# Patient Record
Sex: FEMALE | Race: WHITE | Employment: FULL TIME | URBAN - METROPOLITAN AREA
[De-identification: names, ages, dates, MRNs, and addresses within clinical notes are randomized per-mention and may not be internally consistent; named-entity substitution may affect disease eponyms.]

---

## 2024-02-28 ENCOUNTER — OFFICE VISIT (OUTPATIENT)
Dept: PRIMARY CARE CLINIC | Age: 29
End: 2024-02-28
Payer: COMMERCIAL

## 2024-02-28 VITALS
HEART RATE: 119 BPM | TEMPERATURE: 97.8 F | OXYGEN SATURATION: 96 % | DIASTOLIC BLOOD PRESSURE: 80 MMHG | SYSTOLIC BLOOD PRESSURE: 118 MMHG

## 2024-02-28 DIAGNOSIS — B96.89 ACUTE BACTERIAL SINUSITIS: Primary | ICD-10-CM

## 2024-02-28 DIAGNOSIS — J01.90 ACUTE BACTERIAL SINUSITIS: Primary | ICD-10-CM

## 2024-02-28 DIAGNOSIS — J02.9 SORE THROAT: ICD-10-CM

## 2024-02-28 DIAGNOSIS — Z76.89 ESTABLISHING CARE WITH NEW DOCTOR, ENCOUNTER FOR: ICD-10-CM

## 2024-02-28 LAB — S PYO AG THROAT QL: NORMAL

## 2024-02-28 PROCEDURE — 99203 OFFICE O/P NEW LOW 30 MIN: CPT | Performed by: FAMILY MEDICINE

## 2024-02-28 PROCEDURE — 87880 STREP A ASSAY W/OPTIC: CPT | Performed by: FAMILY MEDICINE

## 2024-02-28 RX ORDER — IPRATROPIUM BROMIDE 42 UG/1
2 SPRAY, METERED NASAL 4 TIMES DAILY
Qty: 15 ML | Refills: 1 | Status: SHIPPED | OUTPATIENT
Start: 2024-02-28

## 2024-02-28 RX ORDER — AMOXICILLIN AND CLAVULANATE POTASSIUM 875; 125 MG/1; MG/1
1 TABLET, FILM COATED ORAL 2 TIMES DAILY
Qty: 14 TABLET | Refills: 0 | Status: SHIPPED | OUTPATIENT
Start: 2024-02-28 | End: 2024-03-06

## 2024-02-28 NOTE — PROGRESS NOTES
months  - amoxicillin-clavulanate (AUGMENTIN) 875-125 MG per tablet; Take 1 tablet by mouth 2 times daily for 7 days  Dispense: 14 tablet; Refill: 0    2. Sore throat  As above  - POCT rapid strep A  - amoxicillin-clavulanate (AUGMENTIN) 875-125 MG per tablet; Take 1 tablet by mouth 2 times daily for 7 days  Dispense: 14 tablet; Refill: 0  - ipratropium (ATROVENT) 0.06 % nasal spray; 2 sprays by Each Nostril route 4 times daily  Dispense: 15 mL; Refill: 1    3. Establishing care with new doctor, encounter for  Updated medical, surgical, family, and social histories and medication list as appropriate.         Counseled regarding above diagnosis, including possible risks and complications, especially if left uncontrolled. Counseled regarding the possible side effects, risks, benefits and alternatives to treatment; patient and/or guardian verbalizes understanding, agrees, feels comfortable with, and wishes to proceed with above treatment plan.    Call or go to ED immediately if symptoms worsen or persist. Advised patient to call with any new medication issues and, as applicable, read all Rx info from pharmacy to assure aware of all possible risks and side effects of medication before taking.    Patient and/or guardian given opportunity to ask questions/raise concerns. The patient verbalized comfort and understanding of instructions.    I encourage further reading and education about your health conditions.  Information on many health conditions is provided by the American Academy [employer]icians: https://familydoctor.org/  Please bring any questions to me at your next visit.    Return to Office: Return in about 3 months (around 5/28/2024) for well visit.    Zara Melgoza DO

## 2024-03-25 PROBLEM — E66.3 OVERWEIGHT: Status: RESOLVED | Noted: 2019-02-13 | Resolved: 2024-03-25

## 2024-03-26 ENCOUNTER — TELEPHONE (OUTPATIENT)
Dept: INTERNAL MEDICINE | Age: 29
End: 2024-03-26

## 2024-03-26 NOTE — TELEPHONE ENCOUNTER
Specialty Medication Service    Date: 3/26/2024  Patient's Name: Xochitl Metcalf YOB: 1995            _____________________________________________________________________________________________    Patient is enrolled in the [facility] pharmacy benefits. A prescription for Qulipta was sent to [facility] pharmacy, however per patient insurance it will need to go to [facility]. Please review, sign and fax to pharmacy if order is still appropriate.     Renny Purvis, PharmD [facility]  Ambulatory Clinical Pharmacist  Specialty Medication Services  Phone: [phone]  Fax: [phone]

## 2024-04-02 NOTE — TELEPHONE ENCOUNTER
Specialty Medication Service    Date: 4/2/2024  Patient's Name: Xochitl Metcalf YOB: 1995            _____________________________________________________________________________________________    Patient no longer has [employer] benefits (termed 3/31/2024). Patient is no longer eligible in [employer] program. No further outreach planned.     Renny Purvis, PharmD [facility]  Ambulatory Clinical Pharmacist  Specialty Medication Services  Phone: [phone]  Fax: [phone]    For Pharmacy Admin Tracking Only    Program: Downstream  CPA in place:  No  Time Spent (min): 15

## 2024-08-29 PROCEDURE — RXMED WILLOW AMBULATORY MEDICATION CHARGE

## 2024-08-30 ENCOUNTER — PHARMACY VISIT (OUTPATIENT)
Dept: PHARMACY | Facility: CLINIC | Age: 29
End: 2024-08-30
Payer: MEDICARE

## 2024-09-19 PROBLEM — Z01.419 WELL WOMAN EXAM WITH ROUTINE GYNECOLOGICAL EXAM: Status: ACTIVE | Noted: 2024-09-19

## 2024-09-19 NOTE — PROGRESS NOTES
Subjective   Patient ID: Ina Rahman is a 29 y.o. female who presents for New Patient Visit (Desires pregnancy).  She presents as a new patient for gyn visit. She desires annual exam in addition to evaluation of fertility. No prior records are available for review. She does not remember when her last pap was exactly but believes it was in 2021. She has a history of abnormal pap and did have colposcopy performed.     She states she has a history of PCOS and irregular menses. Ultrasound was performed in around 2022 and she believes this was without abnormality. She stopped OCP in 2019 and has had no menses since 2022. This last one lasted one month with heavy flow and she has had no bleeding since. She was on metformin in the distant past but was intolerant of it with GI upset. She has not had ultrasound or lab testing recently.     Her partner has had a child and there is no suspicion for abnormality of semen.   She denies any past pelvic infection to suggest tubal occulusion.          Review of Systems   Constitutional:  Negative for activity change.   HENT:  Negative for congestion.    Respiratory:  Negative for apnea and cough.    Cardiovascular:  Negative for chest pain.   Gastrointestinal:  Negative for constipation and diarrhea.   Genitourinary:  Negative for hematuria and vaginal pain.   Musculoskeletal:  Negative for joint swelling.   Neurological:  Negative for dizziness.   Psychiatric/Behavioral:  Negative for agitation.        History reviewed. No pertinent past medical history.   History reviewed. No pertinent surgical history.   No Known Allergies   Current Outpatient Medications on File Prior to Visit   Medication Sig Dispense Refill    rimegepant (NURTEC) 75 mg tablet,disintegrating Take 1 tablet (75 mg) by mouth once daily as needed for migraines. 16 tablet 11     No current facility-administered medications on file prior to visit.        Objective   Physical Exam  Constitutional:       Appearance:  Normal appearance.   Neck:      Thyroid: No thyromegaly.   Cardiovascular:      Rate and Rhythm: Normal rate and regular rhythm.      Heart sounds: Normal heart sounds.   Pulmonary:      Effort: Pulmonary effort is normal.      Breath sounds: Normal breath sounds.   Chest:      Chest wall: No mass.   Breasts:     Right: Normal. No inverted nipple, mass, nipple discharge or skin change.      Left: Normal. No inverted nipple, mass, nipple discharge or skin change.   Abdominal:      General: There is no distension.      Palpations: Abdomen is soft. There is no mass.      Tenderness: There is no abdominal tenderness.   Genitourinary:     General: Normal vulva.      Exam position: Lithotomy position.      Labia:         Right: No rash.         Left: No rash.       Urethra: No urethral lesion.      Vagina: Normal. No lesions.      Cervix: No friability or lesion.      Uterus: Normal. Not enlarged and not tender.       Adnexa: Right adnexa normal and left adnexa normal.        Right: No mass or tenderness.          Left: No mass or tenderness.     Musculoskeletal:         General: No deformity.      Cervical back: Neck supple.   Lymphadenopathy:      Cervical: No cervical adenopathy.   Skin:     General: Skin is warm and dry.      Findings: No rash.   Neurological:      General: No focal deficit present.      Mental Status: She is alert.   Psychiatric:         Mood and Affect: Mood normal.         Behavior: Behavior is cooperative.         Thought Content: Thought content normal.           Problem List Items Addressed This Visit       Well woman exam with routine gynecological exam - Primary    Overview     No prior gyn records or test results are available for review.          Current Assessment & Plan     Pap is sent with HPV and STD screen.  Regular exercise and attaining/maintaining a healthy weight is encouraged.   Adequate calcium intake with diet or supplements is encouraged.    We will notify of any abnormal  results.          Amenorrhea, secondary    Overview     No menses since 2022. Past diagnosis of PCOS.         Current Assessment & Plan     Potential causes for fertility issues were reviewed. These include male factors such as abnormalities in sperm production or quality. Female factors include medical and hormonal issues which may influence ovulation. Potential for scar formation and tubal blockage were also discussed. Based on the medical histories of both partners, appropriate testing was ordered. We will call for a review of these tests as indicated. It is unlikely there is male factor or tubal factor infertility.  FSH, LH, fasting insulin, free and total testosterone, TSH, Hgb A1c, prolactin are ordered along with ultrasound.   HCG is negative in the office. Provera is prescribed to initiate menses. We reviewed potential for endometrial hyperplasia or cancer if there is no regular shedding of the endometrium.  Timed intercourse was also reviewed. The recommendation is for regular intercourse every 1-2 days starting by cycle day 11-12 and continuing through cycle day 16. This will help to assure that sperm are present and waiting for the egg at ovulation.   Taking a daily prenatal vitamin is recommended.         Relevant Medications    medroxyPROGESTERone (Provera) 10 mg tablet    Other Relevant Orders    TSH with reflex to Free T4 if abnormal    FSH & LH    Prolactin    Estradiol    Testosterone,Free and Total    Insulin, Fasting    Hemoglobin A1C    US PELVIS TRANSABDOMINAL WITH TRANSVAGINAL

## 2024-09-19 NOTE — ASSESSMENT & PLAN NOTE
Pap is sent with HPV and STD screen.  Regular exercise and attaining/maintaining a healthy weight is encouraged.   Adequate calcium intake with diet or supplements is encouraged.    We will notify of any abnormal results.

## 2024-09-24 PROCEDURE — RXMED WILLOW AMBULATORY MEDICATION CHARGE

## 2024-09-25 ENCOUNTER — PHARMACY VISIT (OUTPATIENT)
Dept: PHARMACY | Facility: CLINIC | Age: 29
End: 2024-09-25
Payer: MEDICARE

## 2024-09-25 ENCOUNTER — APPOINTMENT (OUTPATIENT)
Dept: OBSTETRICS AND GYNECOLOGY | Facility: CLINIC | Age: 29
End: 2024-09-25
Payer: COMMERCIAL

## 2024-09-25 VITALS
DIASTOLIC BLOOD PRESSURE: 80 MMHG | HEIGHT: 67 IN | SYSTOLIC BLOOD PRESSURE: 118 MMHG | BODY MASS INDEX: 33.27 KG/M2 | WEIGHT: 212 LBS

## 2024-09-25 DIAGNOSIS — N91.1 AMENORRHEA, SECONDARY: ICD-10-CM

## 2024-09-25 DIAGNOSIS — Z01.419 WELL WOMAN EXAM WITH ROUTINE GYNECOLOGICAL EXAM: Primary | ICD-10-CM

## 2024-09-25 LAB — PREGNANCY TEST URINE, POC: NEGATIVE

## 2024-09-25 PROCEDURE — RXMED WILLOW AMBULATORY MEDICATION CHARGE

## 2024-09-25 PROCEDURE — 87591 N.GONORRHOEAE DNA AMP PROB: CPT

## 2024-09-25 PROCEDURE — 87624 HPV HI-RISK TYP POOLED RSLT: CPT

## 2024-09-25 PROCEDURE — 87491 CHLMYD TRACH DNA AMP PROBE: CPT

## 2024-09-25 PROCEDURE — 88175 CYTOPATH C/V AUTO FLUID REDO: CPT

## 2024-09-25 RX ORDER — MEDROXYPROGESTERONE ACETATE 10 MG/1
10 TABLET ORAL DAILY
Qty: 30 TABLET | Refills: 3 | Status: SHIPPED | OUTPATIENT
Start: 2024-09-25 | End: 2025-09-25

## 2024-09-25 ASSESSMENT — ENCOUNTER SYMPTOMS
ACTIVITY CHANGE: 0
DIZZINESS: 0
APNEA: 0
HEMATURIA: 0
JOINT SWELLING: 0
DIARRHEA: 0
AGITATION: 0
CONSTIPATION: 0
COUGH: 0

## 2024-09-25 NOTE — ASSESSMENT & PLAN NOTE
>>ASSESSMENT AND PLAN FOR AMENORRHEA, SECONDARY WRITTEN ON 9/25/2024  8:12 AM BY ALTON MANDEL MD    Potential causes for fertility issues were reviewed. These include male factors such as abnormalities in sperm production or quality. Female factors include medical and hormonal issues which may influence ovulation. Potential for scar formation and tubal blockage were also discussed. Based on the medical histories of both partners, appropriate testing was ordered. We will call for a review of these tests as indicated. It is unlikely there is male factor or tubal factor infertility.  FSH, LH, fasting insulin, free and total testosterone, TSH, Hgb A1c, prolactin are ordered along with ultrasound.   HCG is negative in the office. Provera is prescribed to initiate menses. We reviewed potential for endometrial hyperplasia or cancer if there is no regular shedding of the endometrium.  Timed intercourse was also reviewed. The recommendation is for regular intercourse every 1-2 days starting by cycle day 11-12 and continuing through cycle day 16. This will help to assure that sperm are present and waiting for the egg at ovulation.   Taking a daily prenatal vitamin is recommended.

## 2024-09-25 NOTE — ASSESSMENT & PLAN NOTE
Potential causes for fertility issues were reviewed. These include male factors such as abnormalities in sperm production or quality. Female factors include medical and hormonal issues which may influence ovulation. Potential for scar formation and tubal blockage were also discussed. Based on the medical histories of both partners, appropriate testing was ordered. We will call for a review of these tests as indicated. It is unlikely there is male factor or tubal factor infertility.  FSH, LH, fasting insulin, free and total testosterone, TSH, Hgb A1c, prolactin are ordered along with ultrasound.   HCG is negative in the office. Provera is prescribed to initiate menses. We reviewed potential for endometrial hyperplasia or cancer if there is no regular shedding of the endometrium.  Timed intercourse was also reviewed. The recommendation is for regular intercourse every 1-2 days starting by cycle day 11-12 and continuing through cycle day 16. This will help to assure that sperm are present and waiting for the egg at ovulation.   Taking a daily prenatal vitamin is recommended.

## 2024-09-26 ENCOUNTER — APPOINTMENT (OUTPATIENT)
Dept: ORTHOPEDIC SURGERY | Facility: CLINIC | Age: 29
End: 2024-09-26
Payer: COMMERCIAL

## 2024-09-26 ENCOUNTER — HOSPITAL ENCOUNTER (OUTPATIENT)
Dept: RADIOLOGY | Facility: HOSPITAL | Age: 29
Discharge: HOME | End: 2024-09-26
Payer: COMMERCIAL

## 2024-09-26 VITALS — HEIGHT: 67 IN | WEIGHT: 212 LBS | BODY MASS INDEX: 33.27 KG/M2

## 2024-09-26 DIAGNOSIS — M89.8X1 PAIN OF LEFT CLAVICLE: ICD-10-CM

## 2024-09-26 DIAGNOSIS — M25.512 STERNOCLAVICULAR JOINT PAIN, LEFT: ICD-10-CM

## 2024-09-26 DIAGNOSIS — M89.8X1 PAIN OF LEFT CLAVICLE: Primary | ICD-10-CM

## 2024-09-26 LAB
C TRACH RRNA SPEC QL NAA+PROBE: NEGATIVE
N GONORRHOEA DNA SPEC QL PROBE+SIG AMP: NEGATIVE

## 2024-09-26 PROCEDURE — 3008F BODY MASS INDEX DOCD: CPT

## 2024-09-26 PROCEDURE — 73000 X-RAY EXAM OF COLLAR BONE: CPT | Mod: LT

## 2024-09-26 PROCEDURE — 1036F TOBACCO NON-USER: CPT

## 2024-09-26 PROCEDURE — 99203 OFFICE O/P NEW LOW 30 MIN: CPT

## 2024-09-26 ASSESSMENT — PAIN - FUNCTIONAL ASSESSMENT: PAIN_FUNCTIONAL_ASSESSMENT: 0-10

## 2024-09-26 ASSESSMENT — PAIN SCALES - GENERAL: PAINLEVEL_OUTOF10: 6

## 2024-09-26 NOTE — PROGRESS NOTES
SHAHNAZ Rahman is a 29 y.o. female  in office today for   Chief Complaint   Patient presents with    Left Shoulder - Pain     2021 clavicle injury pulling door  pain is in the clavicle area and travels to the top of her cervical area     .  she has seen seen orthopedic providers for this shoulder before and is wanting a second opinion.  She states the pain today is improved over the last 3 years, but still has daily pain.  Over the years she has done PT which she feels made worse, has seen pain management who have tried lidocaine patches, topical antiinflammatories, ice, heat, muscle relaxants, taking ibuprofen daily, CSI injection into sternoclavicular joint which did not help at all.  She has also been evaluated for thoracic outlet syndrome which was negative, and she has seen neurology as well.    Reviewed past ortho physician notes as well as MRI reports prior to appointment.      Medication  Current Outpatient Medications on File Prior to Visit   Medication Sig Dispense Refill    medroxyPROGESTERone (Provera) 10 mg tablet Take 1 tablet (10 mg) by mouth once daily. Take 1 tablet by mouth daily for 10 days. This can be started if no spontaneous menses by cycle day 45. 30 tablet 3    rimegepant (NURTEC) 75 mg tablet,disintegrating Take 1 tablet (75 mg) by mouth once daily as needed for migraines. 16 tablet 11     No current facility-administered medications on file prior to visit.       Physical Exam  Constitutional: well developed, well nourished female in no acute distress  Psychiatric: normal mood, appropriate affect  Eyes: sclera anicteric  HENT: normocephalic/atraumatic  CV: regular rate and rhythm   Respiratory: non labored breathing  Integumentary: no rash  Neurological: moves all extremities    Shoulder Musculoskeletal Exam    Inspection    Left      Ecchymosis: none      Peripheral edema: none      Atrophy: none      Symmetry: symmetric      Masses: none    Palpation    Left      Crepitus: no  crepitus      Increased warmth: none      Tenderness: present        Posterior shoulder: none        Clavicle: mild        AC joint: none        Sternoclavicular joint: mild        Rotator cuff: none        Greater tuberosity: none        Trapezius: mild        Medial scapula: none        Bicipital groove: none        Proximal biceps: none        Elbow: none    Range of Motion    Left      Active forward elevation: 180.       Shoulder active abduction: 180.       Active external rotation at side: 80.       Internal rotation: mid thoracic.     Range of motion additional comments: Patient hypermobile at elbow    Strength    Left      External rotation: 5/5. External rotation is not affected by pain.       Internal rotation: 5/5. Internal rotation is not affected by pain.       Abduction: 5/5. Abduction is not affected by pain.       Biceps: 5/5.     Special Tests    Left     Rotator Cuff Signs      Neer's test: negative       Lin test: negative       Drop arm test: negative     Biceps/yolanda Signs      Speed's test: negative     AC Joint Signs      Active horizontal adduction pain: negative         Imaging/Lab:  X-rays were taken today which were reviewed by myself and read by myself and show no acute fracture or dislocation.  No degenerative changes.        Assessment  Assessment: left sternoclavicular pain    Plan  Plan:  History, physical exam, and imaging were reviewed with patient. Patient has already tried and failed all conservative measures that I would recommend and has had multiple MRIs that have been negative.  Recommending she follow up with Dr Patino or possibly a thoracis surgeon at this point  Follow Up: Patient to be scheduled with Dr Patino in coming weeks.    All questions were answered for the patient prior to end of exam and patient addressed their understanding.    Jolie Atkins PA-C  09/26/24

## 2024-09-27 ENCOUNTER — APPOINTMENT (OUTPATIENT)
Dept: RADIOLOGY | Facility: HOSPITAL | Age: 29
End: 2024-09-27
Payer: COMMERCIAL

## 2024-10-01 PROBLEM — G43.709 CHRONIC MIGRAINE W/O AURA W/O STATUS MIGRAINOSUS, NOT INTRACTABLE: Status: ACTIVE | Noted: 2024-10-01

## 2024-10-01 PROBLEM — S16.1XXA STRAIN OF STERNOCLEIDOMASTOID MUSCLE: Status: RESOLVED | Noted: 2023-09-12 | Resolved: 2024-10-01

## 2024-10-01 PROBLEM — G43.909 MIGRAINE: Status: RESOLVED | Noted: 2019-02-13 | Resolved: 2024-10-01

## 2024-10-02 ENCOUNTER — LAB (OUTPATIENT)
Dept: LAB | Facility: LAB | Age: 29
End: 2024-10-02
Payer: COMMERCIAL

## 2024-10-02 DIAGNOSIS — N91.1 AMENORRHEA, SECONDARY: ICD-10-CM

## 2024-10-02 LAB
EST. AVERAGE GLUCOSE BLD GHB EST-MCNC: 103 MG/DL
ESTRADIOL SERPL-MCNC: 67 PG/ML
FSH SERPL-ACNC: 5.5 IU/L
HBA1C MFR BLD: 5.2 %
INSULIN P FAST SERPL-ACNC: 13 UIU/ML (ref 3–25)
LH SERPL-ACNC: 21.1 IU/L
PROLACTIN SERPL-MCNC: 8.5 UG/L (ref 3–20)
TSH SERPL-ACNC: 2.53 MIU/L (ref 0.44–3.98)

## 2024-10-02 PROCEDURE — 83002 ASSAY OF GONADOTROPIN (LH): CPT

## 2024-10-02 PROCEDURE — 83036 HEMOGLOBIN GLYCOSYLATED A1C: CPT

## 2024-10-02 PROCEDURE — 84146 ASSAY OF PROLACTIN: CPT

## 2024-10-02 PROCEDURE — 84443 ASSAY THYROID STIM HORMONE: CPT

## 2024-10-02 PROCEDURE — 83525 ASSAY OF INSULIN: CPT

## 2024-10-02 PROCEDURE — 83001 ASSAY OF GONADOTROPIN (FSH): CPT

## 2024-10-02 PROCEDURE — 36415 COLL VENOUS BLD VENIPUNCTURE: CPT

## 2024-10-02 PROCEDURE — 84402 ASSAY OF FREE TESTOSTERONE: CPT

## 2024-10-02 PROCEDURE — 82670 ASSAY OF TOTAL ESTRADIOL: CPT

## 2024-10-03 ENCOUNTER — TELEPHONE (OUTPATIENT)
Dept: OBSTETRICS AND GYNECOLOGY | Facility: CLINIC | Age: 29
End: 2024-10-03
Payer: COMMERCIAL

## 2024-10-03 NOTE — TELEPHONE ENCOUNTER
----- Message from Telma Alonso sent at 10/3/2024 12:41 PM EDT -----  Lab results have returned without abnormality. Testosterone is still pending. We will plan follow up after ultrasound.

## 2024-10-04 LAB
CYTOLOGY CMNT CVX/VAG CYTO-IMP: NORMAL
HPV HR 12 DNA GENITAL QL NAA+PROBE: NEGATIVE
HPV HR GENOTYPES PNL CVX NAA+PROBE: NEGATIVE
HPV16 DNA SPEC QL NAA+PROBE: NEGATIVE
HPV18 DNA SPEC QL NAA+PROBE: NEGATIVE
LAB AP HPV GENOTYPE QUESTION: YES
LAB AP HPV HR: NORMAL
LAB AP PAP ADDITIONAL TESTS: NORMAL
LABORATORY COMMENT REPORT: NORMAL
PATH REPORT.TOTAL CANCER: NORMAL

## 2024-10-06 LAB
TESTOSTERONE FREE (CHAN): 18.4 PG/ML (ref 0.1–6.4)
TESTOSTERONE,TOTAL,LC-MS/MS: 94 NG/DL (ref 2–45)

## 2024-10-08 ENCOUNTER — APPOINTMENT (OUTPATIENT)
Dept: RADIOLOGY | Facility: HOSPITAL | Age: 29
End: 2024-10-08
Payer: COMMERCIAL

## 2024-10-09 ENCOUNTER — OFFICE VISIT (OUTPATIENT)
Dept: ORTHOPEDIC SURGERY | Facility: CLINIC | Age: 29
End: 2024-10-09
Payer: COMMERCIAL

## 2024-10-09 ENCOUNTER — APPOINTMENT (OUTPATIENT)
Dept: DERMATOLOGY | Facility: CLINIC | Age: 29
End: 2024-10-09
Payer: COMMERCIAL

## 2024-10-09 VITALS — HEIGHT: 67 IN | BODY MASS INDEX: 32.96 KG/M2 | WEIGHT: 210 LBS

## 2024-10-09 DIAGNOSIS — M89.8X1 PAIN OF LEFT CLAVICLE: ICD-10-CM

## 2024-10-09 DIAGNOSIS — M25.512 STERNOCLAVICULAR JOINT PAIN, LEFT: ICD-10-CM

## 2024-10-09 PROCEDURE — 99214 OFFICE O/P EST MOD 30 MIN: CPT | Performed by: ORTHOPAEDIC SURGERY

## 2024-10-09 PROCEDURE — 3008F BODY MASS INDEX DOCD: CPT | Performed by: ORTHOPAEDIC SURGERY

## 2024-10-09 RX ORDER — NAPROXEN 500 MG/1
500 TABLET ORAL 2 TIMES DAILY PRN
Qty: 60 TABLET | Refills: 0 | Status: SHIPPED | OUTPATIENT
Start: 2024-10-09 | End: 2024-11-08

## 2024-10-09 ASSESSMENT — PAIN - FUNCTIONAL ASSESSMENT: PAIN_FUNCTIONAL_ASSESSMENT: 0-10

## 2024-10-09 ASSESSMENT — PAIN SCALES - GENERAL: PAINLEVEL_OUTOF10: 6

## 2024-10-09 NOTE — PROGRESS NOTES
History of Present Illness:    29 y.o. female presents to clinic today for further left clavicle.  She states back in 2021 she was working at a [employer] and she opened a heavy steel door and felt a pulling sensation across her chest.  She states that she had pain ever since.  She notes that her pain is predominantly over the sternoclavicular joint that occasionally will radiate up into her trapezius muscles and maybe shoulder blade.  She denies any shoulder pain today.  She states that she has never had shoulder pain.  She has done an extensive workup.  She has previously completed 3 months of physical therapy back in 2021 on the left shoulder.  She states it never really helped her pain.  She has also been seen by pain management clinic where they have done a cortisone injection into the sternoclavicular joint.  She states that did not provide her any improvement.  She does have some neck pain baseline.  She occasionally will get some numbness and tingling down to her hand when her pain is more severe.  She has been worked up for thoracic outlet syndrome and it was negative.  She does see a neurologist but only for her migraines but they said they were not concerned.  She has used topical anti-inflammatories without any improvement.  She also take oral anti-inflammatories with minimal relief.  She states that she has pain with range of motion and activity.  She states that it is difficult to lift things with the left side because of causes a pulling sensation over the SC joint.  She has not had any surgeries.        Review of Systems:    GENERAL: Negative  GI: Negative  MUSCULOSKELETAL: See HPI  SKIN: Negative  NEURO:  Negative     Physical Exam:  Patients' self reported past medical history, medications, allergies, surgical history, family and social history as well as a 10 point review of systems has been documented in the new patient intake form and scanned into the patient's electronic medical record.  The  intake form was reviewed by Dr Patino during the office visit and signed by Dr. Patino and the patient.  Pertinent findings are documented in the HPI.    General Multi-System Physical Exam:  Constitutional  General appearance:  Alert, oriented, and in no acute distress.  Well developed, well nourished.  Head and Face  Head and face:  Normocephalic and atraumatic.  Ears, Nose, Mouth, and Throat  External inspection of ears and nose: Normal.  Eyes:  Pupils are equal and round.  Neck  Neck:  no neck mass was observed.  Pulmonary  Respiratory effort:  no respiratory distress.  Cardiovascular  Intact distal pulses.  Lymphatic  Palpation of lymph nodes in the affected extremity:  Normal.  Skin  Skin and subcutaneous tissue:  Normal skin color and pigmentation.  Normal skin turgor.  No rashes.  Neurologic  Sensation:  normal to light touch.  Psychiatric  Judgement and insight:  Intact.  Mood and affect:  Normal.  Musculoskeletal    Shoulder:  Examination of the left shoulder demonstrates skin is intact.  There is no deformity noted.  There is significant tenderness palpation over the sternoclavicular joint.  Patient has no tenderness location over the mid shaft of the clavicle.  There is no tenderness palpation over the AC joint.  Passive range of motion is 140 degrees of forward flexion abduction, externally rotates to 80, and internally rotates to the inferior scapula.  Patient has no pain with range of motion of the left shoulder but notes a pulling sensation across the sternoclavicular joint.  Negative Neer's and Lin testing.  Negative Jobes with mild weakness but no pain.  Elbow and wrist motion were not irritable.  Radial pulse 2+ and palpable. SILT. UE is NVI.     Imaging:     Dr Patino personally reviewed the results of the x-rays that had been performed recently on this patient.    In addition, Dr Patino independently interpreted the patient's x-rays (performed by the Radiology department) by viewing the x-ray  images and this is Dr. Patino's personal interpretation:     Left clavicle.  X-rays of the left clavicle demonstrates no acute fractures or dislocation.  There is no significant AC joint or arthritis noted.    MRI report reviewed.  Patient was unable to obtain MRI images.  Dr Patino independently interpreted the patient's MRI (performed by the Radiology department)  and this is Dr. Patino's personal interpretation:     Report of the left shoulder demonstrates nonspecific bone marrow edema in the medial right clavicle and upper sternum.  No evidence of sternoclavicular dislocation.  No arthritis noted.  No appreciable fractures noted.    Assessment:   Left sternoclavicular pain related to nonspecific bone marrow edema    Plan:  Discussed management with patient today.  At this time we discussed with her there is no surgical intervention that we would recommend.  Discussed with her that her pain does not appear to be coming from the shoulder.  We discussed applying a second course of physical therapy but the patient declined it at this time.  She can continue to take anti-inflammatories as needed for pain.  At this time would recommend that the patient be seen by thoracic surgery to see if there is any surgical intervention or further management that they would recommend.  We did discuss with the patient that if she could obtain the images prior to her appointment with thoracic surgery that would be best.  Will follow-up the patient as needed regarding this issue.        This patient has had longstanding pain and weakness in their affected extremity which has gotten worse over the last few months.  Non-operative treatment has failed to help this patient and the pain is somewhat worsening.  That would classify this problem as a chronic illness with exacerbation and progression.    Due to this patient's condition, they are at a moderate risk of morbidity from additional diagnostic testing / treatment.

## 2024-10-10 ENCOUNTER — APPOINTMENT (OUTPATIENT)
Dept: OBSTETRICS AND GYNECOLOGY | Facility: CLINIC | Age: 29
End: 2024-10-10
Payer: COMMERCIAL

## 2024-10-17 ENCOUNTER — HOSPITAL ENCOUNTER (OUTPATIENT)
Dept: RADIOLOGY | Facility: HOSPITAL | Age: 29
Discharge: HOME | End: 2024-10-17
Payer: COMMERCIAL

## 2024-10-17 DIAGNOSIS — N91.1 AMENORRHEA, SECONDARY: ICD-10-CM

## 2024-10-17 PROCEDURE — 76856 US EXAM PELVIC COMPLETE: CPT

## 2024-10-18 PROBLEM — N91.2 ANOVULATORY AMENORRHEA: Status: ACTIVE | Noted: 2024-10-18

## 2024-10-18 PROBLEM — N91.2 ANOVULATORY AMENORRHEA: Status: ACTIVE | Noted: 2024-09-25

## 2024-10-18 PROBLEM — E28.2 PCOS (POLYCYSTIC OVARIAN SYNDROME): Status: ACTIVE | Noted: 2024-10-18

## 2024-10-18 NOTE — PROGRESS NOTES
Subjective   Patient ID: Ina Rahman is a 29 y.o. female who presents for Follow-up (Review gyn [facility]).  9/25/2024 documentation:  She presents as a new patient for gyn visit. She desires annual exam in addition to evaluation of fertility. No prior records are available for review. She does not remember when her last pap was exactly but believes it was in 2021. She has a history of abnormal pap and did have colposcopy performed.     She states she has a history of PCOS and irregular menses. Ultrasound was performed in around 2022 and she believes this was without abnormality. She stopped OCP in 2019 and has had no menses since 2022. This last one lasted one month with heavy flow and she has had no bleeding since. She was on metformin in the distant past but was intolerant of it with GI upset. She has not had ultrasound or lab testing recently.     Her partner has had a child and there is no suspicion for abnormality of semen.   She denies any past pelvic infection to suggest tubal occulusion.    10/23/2024:  Pelvic ultrasound was performed on 10/18/2024 after taking provera 10 mg for 10 days. This returned with normal appearing uterus with 3 mm endometrium and normal appearance of both ovaries.  Labs returned with normal results for TSH, FSH, LH, estradiol, prolactin, fasting insulin and Hgb A1c.   LH:FSH ration is elevated.  Free testosterone was elevated at 18.4 (normal range 0.1-6.4) and total testosterone was elevated at 94 (normal 2-45).    Her labs suggest PCOS and she is a candidate for ovulation induction.          Review of Systems   Constitutional:  Negative for activity change.   HENT:  Negative for congestion.    Respiratory:  Negative for apnea and cough.    Cardiovascular:  Negative for chest pain.   Gastrointestinal:  Negative for constipation and diarrhea.   Genitourinary:  Negative for hematuria and vaginal pain.   Musculoskeletal:  Negative for joint swelling.   Neurological:  Negative for  dizziness.   Psychiatric/Behavioral:  Negative for agitation.        History reviewed. No pertinent past medical history.   History reviewed. No pertinent surgical history.   No Known Allergies   Current Outpatient Medications on File Prior to Visit   Medication Sig Dispense Refill    medroxyPROGESTERone (Provera) 10 mg tablet Take 1 tablet (10 mg) by mouth once daily. Take 1 tablet by mouth daily for 10 days. This can be started if no spontaneous menses by cycle day 45. 30 tablet 3    rimegepant (NURTEC) 75 mg tablet,disintegrating Take 1 tablet (75 mg) by mouth once daily as needed for migraines. 16 tablet 11    naproxen (Naprosyn) 500 mg tablet Take 1 tablet (500 mg) by mouth 2 times a day as needed for mild pain (1 - 3). (Patient not taking: Reported on 10/23/2024) 60 tablet 0     No current facility-administered medications on file prior to visit.        Objective   Physical Exam  Constitutional:       Appearance: Normal appearance.   Neurological:      Mental Status: She is alert and oriented to person, place, and time.   Psychiatric:         Attention and Perception: Attention normal.         Mood and Affect: Mood and affect normal.         Speech: Speech normal.         Behavior: Behavior normal. Behavior is cooperative.           Problem List Items Addressed This Visit       PCOS (polycystic ovarian syndrome)    Overview     Oligomenorrhea. Lab testing returned with elevated LH:FSH ratio and elevated testosterone level.  Clomid ovulation induction is desired.          Relevant Medications    clomiPHENE (Clomid) 50 mg tablet    Anovulatory amenorrhea    Overview     No menses since 2022. Past diagnosis of PCOS.  Normal lab results for TSH, LH, FSH, estradiol, prolactin, Hgb A1c, fasting insulin. LH:FSH ratio is elevated.  Free and total testosterone are elevated.  10/18/2024 pelvic ultrasound shows a normal sized uterus with thin endometrium measuring 3 mm and no adnexal abnormality.         Current  Assessment & Plan     She is very interested in beginning clomid for ovulation induction. Plan clomiphene citrate 50 mg daily on cycle days 3-7. We will plan ultrasound on cycle day 14 with follow up and progesterone level one week later.   Timed intercourse is reviewed.  Prenatal vitamins are recommended.  If needed she will restart provera to initiate menses prior to starting clomiphene citrate.          Relevant Medications    clomiPHENE (Clomid) 50 mg tablet     Other Visit Diagnoses       Amenorrhea, secondary    -  Primary    Relevant Medications    clomiPHENE (Clomid) 50 mg tablet

## 2024-10-20 PROCEDURE — RXMED WILLOW AMBULATORY MEDICATION CHARGE

## 2024-10-23 ENCOUNTER — APPOINTMENT (OUTPATIENT)
Dept: OBSTETRICS AND GYNECOLOGY | Facility: CLINIC | Age: 29
End: 2024-10-23
Payer: COMMERCIAL

## 2024-10-23 VITALS — DIASTOLIC BLOOD PRESSURE: 82 MMHG | SYSTOLIC BLOOD PRESSURE: 128 MMHG | BODY MASS INDEX: 33.52 KG/M2 | WEIGHT: 214 LBS

## 2024-10-23 DIAGNOSIS — E28.2 PCOS (POLYCYSTIC OVARIAN SYNDROME): ICD-10-CM

## 2024-10-23 DIAGNOSIS — N91.1 AMENORRHEA, SECONDARY: Primary | ICD-10-CM

## 2024-10-23 DIAGNOSIS — N91.2 ANOVULATORY AMENORRHEA: ICD-10-CM

## 2024-10-23 PROCEDURE — 99214 OFFICE O/P EST MOD 30 MIN: CPT | Performed by: OBSTETRICS & GYNECOLOGY

## 2024-10-23 PROCEDURE — RXMED WILLOW AMBULATORY MEDICATION CHARGE

## 2024-10-23 PROCEDURE — 1036F TOBACCO NON-USER: CPT | Performed by: OBSTETRICS & GYNECOLOGY

## 2024-10-23 RX ORDER — CLOMIPHENE CITRATE 50 MG/1
50 TABLET ORAL DAILY
Qty: 5 TABLET | Refills: 0 | Status: SHIPPED | OUTPATIENT
Start: 2024-10-23 | End: 2024-10-29

## 2024-10-23 ASSESSMENT — ENCOUNTER SYMPTOMS
DIZZINESS: 0
COUGH: 0
APNEA: 0
DIARRHEA: 0
HEMATURIA: 0
JOINT SWELLING: 0
ACTIVITY CHANGE: 0
CONSTIPATION: 0
AGITATION: 0

## 2024-10-23 NOTE — ASSESSMENT & PLAN NOTE
She is very interested in beginning clomid for ovulation induction. Plan clomiphene citrate 50 mg daily on cycle days 3-7. We will plan ultrasound on cycle day 14 with follow up and progesterone level one week later.   Timed intercourse is reviewed.  Prenatal vitamins are recommended.  If needed she will restart provera to initiate menses prior to starting clomiphene citrate.

## 2024-10-29 ENCOUNTER — PHARMACY VISIT (OUTPATIENT)
Dept: PHARMACY | Facility: CLINIC | Age: 29
End: 2024-10-29
Payer: MEDICARE

## 2024-11-22 ENCOUNTER — TELEPHONE (OUTPATIENT)
Dept: OBSTETRICS AND GYNECOLOGY | Facility: CLINIC | Age: 29
End: 2024-11-22
Payer: COMMERCIAL

## 2024-11-22 DIAGNOSIS — N91.2 ANOVULATORY AMENORRHEA: ICD-10-CM

## 2024-11-22 NOTE — TELEPHONE ENCOUNTER
Patient called stating she started her period on Wednesday. Stating she was advised to schedule on Ultrasound appointment on day 14 of her period. Please advise

## 2024-11-25 PROCEDURE — RXMED WILLOW AMBULATORY MEDICATION CHARGE

## 2024-11-25 NOTE — TELEPHONE ENCOUNTER
LMP 11/20.  Ultrasound scheduled for day 14, December 3,2024.  Will follow up with Dr. Alonso the following week.

## 2024-12-03 ENCOUNTER — HOSPITAL ENCOUNTER (OUTPATIENT)
Dept: RADIOLOGY | Facility: HOSPITAL | Age: 29
Discharge: HOME | End: 2024-12-03
Payer: COMMERCIAL

## 2024-12-03 DIAGNOSIS — N91.2 ANOVULATORY AMENORRHEA: ICD-10-CM

## 2024-12-03 PROCEDURE — 76830 TRANSVAGINAL US NON-OB: CPT | Performed by: STUDENT IN AN ORGANIZED HEALTH CARE EDUCATION/TRAINING PROGRAM

## 2024-12-03 PROCEDURE — 76856 US EXAM PELVIC COMPLETE: CPT | Performed by: STUDENT IN AN ORGANIZED HEALTH CARE EDUCATION/TRAINING PROGRAM

## 2024-12-03 PROCEDURE — 76856 US EXAM PELVIC COMPLETE: CPT

## 2024-12-04 ENCOUNTER — PHARMACY VISIT (OUTPATIENT)
Dept: PHARMACY | Facility: CLINIC | Age: 29
End: 2024-12-04
Payer: MEDICARE

## 2024-12-04 NOTE — PROGRESS NOTES
Subjective   Patient ID: Ina Rahman is a 29 y.o. female who presents for review US.  9/25/2024 documentation:  She presents as a new patient for gyn visit. She desires annual exam in addition to evaluation of fertility. No prior records are available for review. She does not remember when her last pap was exactly but believes it was in 2021. She has a history of abnormal pap and did have colposcopy performed.     She states she has a history of PCOS and irregular menses. Ultrasound was performed in around 2022 and she believes this was without abnormality. She stopped OCP in 2019 and has had no menses since 2022. This last one lasted one month with heavy flow and she has had no bleeding since. She was on metformin in the distant past but was intolerant of it with GI upset. She has not had ultrasound or lab testing recently.     Her partner has had a child and there is no suspicion for abnormality of semen.   She denies any past pelvic infection to suggest tubal occulusion.    10/23/2024:  Pelvic ultrasound was performed on 10/18/2024 after taking provera 10 mg for 10 days. This returned with normal appearing uterus with 3 mm endometrium and normal appearance of both ovaries.  Labs returned with normal results for TSH, FSH, LH, estradiol, prolactin, fasting insulin and Hgb A1c.   LH:FSH ration is elevated.  Free testosterone was elevated at 18.4 (normal range 0.1-6.4) and total testosterone was elevated at 94 (normal 2-45).    Her labs suggest PCOS and she is a candidate for ovulation induction.    12/11/2024:  This visit was completed via telehealth. All issues as below were discussed and addressed but no physical exam was performed. If it was felt that the patient should be evaluated in the office then they were directed there. The patient verbally consented to the visit.    She took clomiphene citrate 50 mg cycle days 3-7. Pelvic ultrasound was performed on cycle day 14 on 12/4/2024. The largest dominant  follicle measured only 1 cm. She states while taking Clomid she had some hot flushes and mood swings. They are having timed intercourse.  We reviewed that the ultrasound suggests she did not ovulate on this clomid dose. We will confirm this with a progesterone level. Plan to increase clomid dose next cycle.           Review of Systems   Constitutional:  Negative for activity change.   HENT:  Negative for congestion.    Respiratory:  Negative for apnea and cough.    Cardiovascular:  Negative for chest pain.   Gastrointestinal:  Negative for constipation and diarrhea.   Genitourinary:  Negative for hematuria and vaginal pain.   Musculoskeletal:  Negative for joint swelling.   Neurological:  Negative for dizziness.   Psychiatric/Behavioral:  Negative for agitation.        History reviewed. No pertinent past medical history.   History reviewed. No pertinent surgical history.   No Known Allergies   Current Outpatient Medications on File Prior to Visit   Medication Sig Dispense Refill    medroxyPROGESTERone (Provera) 10 mg tablet Take 1 tablet (10 mg) by mouth once daily. Take 1 tablet by mouth daily for 10 days. This can be started if no spontaneous menses by cycle day 45. 30 tablet 3    rimegepant (NURTEC) 75 mg tablet,disintegrating Take 1 tablet (75 mg) by mouth once daily as needed for migraines. 16 tablet 11     No current facility-administered medications on file prior to visit.        Objective   Physical Exam  Constitutional:       Appearance: Normal appearance.   Neurological:      Mental Status: She is alert and oriented to person, place, and time.   Psychiatric:         Attention and Perception: Attention normal.         Mood and Affect: Mood and affect normal.         Speech: Speech normal.         Behavior: Behavior normal. Behavior is cooperative.           Problem List Items Addressed This Visit       PCOS (polycystic ovarian syndrome)    Overview     Oligomenorrhea. Lab testing returned with elevated  LH:FSH ratio and elevated testosterone level.  Clomid ovulation induction is desired.          Anovulatory amenorrhea - Primary    Overview     No menses since 2022. Past diagnosis of PCOS.  Normal lab results for TSH, LH, FSH, estradiol, prolactin, Hgb A1c, fasting insulin. LH:FSH ratio is elevated.  Free and total testosterone are elevated.  10/18/2024 pelvic ultrasound shows a normal sized uterus with thin endometrium measuring 3 mm and no adnexal abnormality.  On clomid 50 mg cycle days 3-7, largest dominant follicle measured 1 cm.          Current Assessment & Plan     Progesterone is ordered. Plan to increase to 100 mg of clomid next cycle. Prenatal vitamins and timed intercourse is recommended.

## 2024-12-11 ENCOUNTER — APPOINTMENT (OUTPATIENT)
Dept: OBSTETRICS AND GYNECOLOGY | Facility: CLINIC | Age: 29
End: 2024-12-11
Payer: COMMERCIAL

## 2024-12-11 VITALS — WEIGHT: 215 LBS | BODY MASS INDEX: 33.67 KG/M2

## 2024-12-11 DIAGNOSIS — N91.2 ANOVULATORY AMENORRHEA: Primary | ICD-10-CM

## 2024-12-11 DIAGNOSIS — E28.2 PCOS (POLYCYSTIC OVARIAN SYNDROME): ICD-10-CM

## 2024-12-11 PROCEDURE — RXMED WILLOW AMBULATORY MEDICATION CHARGE

## 2024-12-11 PROCEDURE — 1036F TOBACCO NON-USER: CPT | Performed by: OBSTETRICS & GYNECOLOGY

## 2024-12-11 PROCEDURE — 99213 OFFICE O/P EST LOW 20 MIN: CPT | Performed by: OBSTETRICS & GYNECOLOGY

## 2024-12-11 RX ORDER — CLOMIPHENE CITRATE 50 MG/1
100 TABLET ORAL DAILY
Qty: 10 TABLET | Refills: 0 | Status: SHIPPED | OUTPATIENT
Start: 2024-12-11 | End: 2024-12-17

## 2024-12-11 ASSESSMENT — ENCOUNTER SYMPTOMS
COUGH: 0
DIARRHEA: 0
APNEA: 0
CONSTIPATION: 0
HEMATURIA: 0
AGITATION: 0
ACTIVITY CHANGE: 0
JOINT SWELLING: 0
DIZZINESS: 0

## 2024-12-11 NOTE — ASSESSMENT & PLAN NOTE
Progesterone is ordered. Plan to increase to 100 mg of clomid next cycle. Prenatal vitamins and timed intercourse is recommended.

## 2024-12-23 ENCOUNTER — TELEPHONE (OUTPATIENT)
Dept: OBSTETRICS AND GYNECOLOGY | Facility: CLINIC | Age: 29
End: 2024-12-23
Payer: COMMERCIAL

## 2024-12-23 ENCOUNTER — PHARMACY VISIT (OUTPATIENT)
Dept: PHARMACY | Facility: CLINIC | Age: 29
End: 2024-12-23
Payer: MEDICARE

## 2024-12-23 NOTE — TELEPHONE ENCOUNTER
Patient called stating she was advised to get an Ultrasound scheduled. Asking for an order to be placed as she would like to get this scheduled.

## 2024-12-24 DIAGNOSIS — N91.2 ANOVULATORY AMENORRHEA: Primary | ICD-10-CM

## 2025-01-02 ENCOUNTER — HOSPITAL ENCOUNTER (EMERGENCY)
Age: 30
Discharge: HOME | End: 2025-01-02
Payer: COMMERCIAL

## 2025-01-02 VITALS
SYSTOLIC BLOOD PRESSURE: 106 MMHG | HEART RATE: 79 BPM | OXYGEN SATURATION: 96 % | RESPIRATION RATE: 18 BRPM | BODY MASS INDEX: 32.89 KG/M2 | WEIGHT: 210 LBS | DIASTOLIC BLOOD PRESSURE: 78 MMHG | TEMPERATURE: 98.7 F

## 2025-01-02 DIAGNOSIS — U07.1 INFECTION DUE TO 2019-NCOV: Primary | ICD-10-CM

## 2025-01-02 PROCEDURE — 99211 OFF/OP EST MAY X REQ PHY/QHP: CPT

## 2025-01-02 PROCEDURE — 6360000002 HC RX W HCPCS: Performed by: NURSE PRACTITIONER

## 2025-01-02 RX ORDER — KETOROLAC TROMETHAMINE 30 MG/ML
30 INJECTION, SOLUTION INTRAMUSCULAR; INTRAVENOUS ONCE
Status: COMPLETED | OUTPATIENT
Start: 2025-01-02 | End: 2025-01-02

## 2025-01-02 RX ORDER — ONDANSETRON 4 MG/1
4 TABLET, ORALLY DISINTEGRATING ORAL 3 TIMES DAILY PRN
Qty: 21 TABLET | Refills: 0 | Status: SHIPPED | OUTPATIENT
Start: 2025-01-02

## 2025-01-02 RX ORDER — METHYLPREDNISOLONE 4 MG/1
TABLET ORAL
Qty: 1 KIT | Refills: 0 | Status: SHIPPED | OUTPATIENT
Start: 2025-01-02 | End: 2025-01-08

## 2025-01-02 RX ADMIN — KETOROLAC TROMETHAMINE 30 MG: 30 INJECTION, SOLUTION INTRAMUSCULAR at 14:00

## 2025-01-02 ASSESSMENT — PAIN DESCRIPTION - LOCATION: LOCATION: HEAD

## 2025-01-02 ASSESSMENT — PAIN DESCRIPTION - ORIENTATION: ORIENTATION: OTHER (COMMENT)

## 2025-01-02 ASSESSMENT — PAIN - FUNCTIONAL ASSESSMENT: PAIN_FUNCTIONAL_ASSESSMENT: NONE - DENIES PAIN

## 2025-01-02 ASSESSMENT — PAIN SCALES - GENERAL: PAINLEVEL_OUTOF10: 8

## 2025-01-02 ASSESSMENT — PAIN DESCRIPTION - DESCRIPTORS: DESCRIPTORS: ACHING

## 2025-01-02 NOTE — DISCHARGE INSTR - COC
Continuity of Care Form    Patient Name: Xochitl Metcalf   :  1995  MRN:  [id number]    Admit date:  2025  Discharge date:  ***    Code Status Order: No Order   Advance Directives:   Advance Care Flowsheet Documentation             Admitting Physician:  No admitting provider for patient encounter.  PCP: No primary care provider on file.    Discharging Nurse: ***  Discharging Hospital Unit/Room#:   Discharging Unit Phone Number: ***    Emergency Contact:   Extended Emergency Contact Information  Primary Emergency Contact: Michael Stokes  Address: [address]  Home Phone: [phone]  Mobile Phone: [phone]  Relation: Parent  Secondary Emergency Contact: Lakia Stokes  Home Phone: [phone]  Relation: Parent    Past Surgical History:  Past Surgical History:   Procedure Laterality Date    CYST REMOVAL      face    TEAR DUCT SURGERY         Immunization History:   Immunization History   Administered Date(s) Administered    COVID-19, PFIZER PURPLE top, DILUTE for use, (age 12 y+), 30mcg/0.3mL 2021, 2021    DTaP vaccine 1999    Influenza Virus Vaccine 2022    MMR, PRIORIX, M-M-R II, (age 12m+), SC, 0.5mL 1999    Polio OPV 1999       Active Problems:  Patient Active Problem List   Diagnosis Code    Anxiety disorder F41.9    Epidermoid cyst of skin L72.0    Hyperhidrosis R61    Polycystic ovary syndrome E28.2    Vitamin D deficiency E55.9    Chronic left shoulder pain M25.512, G89.29    Chronic pain syndrome G89.4    Chronic migraine w/o aura w/o status migrainosus, not intractable G43.709       Isolation/Infection:   Isolation            No Isolation          Patient Infection Status       None to display                     Nurse Assessment:  Last Vital Signs: /78   Pulse 79   Temp 98.7 °F (37.1 °C)   Resp 18   Wt 95.3 kg (210 lb)   LMP 2024 (Approximate)   SpO2 96%   BMI 32.89 kg/m²     Last

## 2025-01-02 NOTE — ED PROVIDER NOTES
Independent NAZANIN Visit.        [facility] U[facility]ENT [facility]ARE  ED  Encounter Note  Admit Date/RoomTime: 2025  1:22 PM  ED Room:   NAME: Xochitl Metcalf  : 1995  MRN: [id number]  PCP: No primary care provider on file.    CHIEF COMPLAINT     Positive For Covid-19 (Pt reports she tested positive for Covid 6 day ago, but is still experiencing diarrhea and nausea, dry cough, sinus drainage, h/a. Nurtec this am. Needs work note)    HISTORY OF PRESENT ILLNESS        Xochitl Metcalf is a 29 y.o. female who presents to the ED with complaints of cough and nausea that has been ongoing for the past 6 days.  Patient states she was diagnosed with COVID-19 6 days ago and is still experiencing diarrhea, nausea, dry cough and sinus drainage.  Patient would also like a work excuse to return to work on Monday.  Patient states generalized bodyaches and fatigue.  Patient states intermittent low-grade temperatures.  Patient denies chest pain or shortness of breath.  Patient states headache.    REVIEW OF SYSTEMS     Pertinent positives and negatives are stated within HPI, all other systems reviewed and are negative.    Past Medical History:  has a past medical history of Anxiety, Migraines, and PCOS (polycystic ovarian syndrome).  Surgical History:  has a past surgical history that includes Tear duct surgery and cyst removal.  Social History:  reports that she has never smoked. She has never used smokeless tobacco. She reports that she does not drink alcohol and does not use drugs.  Family History: family history includes Diabetes in her mother; Hypertension in her father; Migraines in her father.   Allergies: Patient has no known allergies.  CURRENT MEDICATIONS       Discharge Medication List as of 2025  2:06 PM        CONTINUE these medications which have NOT CHANGED    Details   rimegepant sulfate (NURTEC) 75 MG TBDP Take 75 mg by mouth daily as needed (migraine), Disp-16 tablet, R-11Lot#: [id number]     Exp:

## 2025-01-03 ENCOUNTER — HOSPITAL ENCOUNTER (OUTPATIENT)
Dept: RADIOLOGY | Facility: HOSPITAL | Age: 30
Discharge: HOME | End: 2025-01-03
Payer: COMMERCIAL

## 2025-01-03 DIAGNOSIS — N91.2 ANOVULATORY AMENORRHEA: ICD-10-CM

## 2025-01-03 PROCEDURE — 76856 US EXAM PELVIC COMPLETE: CPT

## 2025-01-08 ENCOUNTER — APPOINTMENT (OUTPATIENT)
Dept: OBSTETRICS AND GYNECOLOGY | Facility: CLINIC | Age: 30
End: 2025-01-08
Payer: COMMERCIAL

## 2025-01-08 VITALS — WEIGHT: 215 LBS | BODY MASS INDEX: 33.67 KG/M2

## 2025-01-08 DIAGNOSIS — N91.2 ANOVULATORY AMENORRHEA: Primary | ICD-10-CM

## 2025-01-08 PROCEDURE — 99213 OFFICE O/P EST LOW 20 MIN: CPT | Performed by: OBSTETRICS & GYNECOLOGY

## 2025-01-08 PROCEDURE — RXMED WILLOW AMBULATORY MEDICATION CHARGE

## 2025-01-08 PROCEDURE — 1036F TOBACCO NON-USER: CPT | Performed by: OBSTETRICS & GYNECOLOGY

## 2025-01-08 RX ORDER — CLOMIPHENE CITRATE 50 MG/1
150 TABLET ORAL DAILY
Qty: 15 TABLET | Refills: 0 | Status: SHIPPED | OUTPATIENT
Start: 2025-01-08 | End: 2025-01-14

## 2025-01-08 ASSESSMENT — ENCOUNTER SYMPTOMS
DIZZINESS: 0
CONSTIPATION: 0
HEMATURIA: 0
COUGH: 0
DIARRHEA: 0
ACTIVITY CHANGE: 0
JOINT SWELLING: 0
AGITATION: 0
APNEA: 0

## 2025-01-08 NOTE — ASSESSMENT & PLAN NOTE
Will plan to increase to clomid 150 mg next cycle.   Timed intercourse and prenatal vitamins are recommended.  She will call with day 1 of menses to schedule ultrasound on day 14.

## 2025-01-14 PROCEDURE — RXMED WILLOW AMBULATORY MEDICATION CHARGE

## 2025-01-22 ENCOUNTER — LAB (OUTPATIENT)
Dept: LAB | Facility: LAB | Age: 30
End: 2025-01-22
Payer: COMMERCIAL

## 2025-01-22 ENCOUNTER — TELEPHONE (OUTPATIENT)
Dept: OBSTETRICS AND GYNECOLOGY | Facility: CLINIC | Age: 30
End: 2025-01-22

## 2025-01-22 ENCOUNTER — PHARMACY VISIT (OUTPATIENT)
Dept: PHARMACY | Facility: CLINIC | Age: 30
End: 2025-01-22
Payer: MEDICARE

## 2025-01-22 DIAGNOSIS — R30.0 DYSURIA: ICD-10-CM

## 2025-01-22 DIAGNOSIS — R30.0 DYSURIA: Primary | ICD-10-CM

## 2025-01-22 PROCEDURE — RXMED WILLOW AMBULATORY MEDICATION CHARGE

## 2025-01-22 PROCEDURE — 87086 URINE CULTURE/COLONY COUNT: CPT

## 2025-01-22 PROCEDURE — 87186 SC STD MICRODIL/AGAR DIL: CPT

## 2025-01-22 RX ORDER — NITROFURANTOIN 25; 75 MG/1; MG/1
100 CAPSULE ORAL 2 TIMES DAILY
Qty: 10 CAPSULE | Refills: 0 | Status: SHIPPED | OUTPATIENT
Start: 2025-01-22 | End: 2025-01-27

## 2025-01-24 LAB — BACTERIA UR CULT: ABNORMAL

## 2025-01-27 ENCOUNTER — TELEPHONE (OUTPATIENT)
Dept: OBSTETRICS AND GYNECOLOGY | Facility: CLINIC | Age: 30
End: 2025-01-27
Payer: COMMERCIAL

## 2025-01-27 DIAGNOSIS — E28.9 OVARIAN DYSFUNCTION: ICD-10-CM

## 2025-01-27 DIAGNOSIS — N91.2 ANOVULATORY AMENORRHEA: ICD-10-CM

## 2025-01-27 DIAGNOSIS — N97.0 ANOVULATION: ICD-10-CM

## 2025-01-27 NOTE — TELEPHONE ENCOUNTER
Lmp January 22,2025, on clomid.  Ultrasound scheduled for February 4,2025 with follow up appointment the following week.

## 2025-02-04 ENCOUNTER — HOSPITAL ENCOUNTER (OUTPATIENT)
Dept: RADIOLOGY | Facility: HOSPITAL | Age: 30
Discharge: HOME | End: 2025-02-04
Payer: COMMERCIAL

## 2025-02-04 DIAGNOSIS — E28.9 OVARIAN DYSFUNCTION: ICD-10-CM

## 2025-02-04 DIAGNOSIS — N97.0 ANOVULATION: ICD-10-CM

## 2025-02-04 PROCEDURE — 76830 TRANSVAGINAL US NON-OB: CPT | Performed by: STUDENT IN AN ORGANIZED HEALTH CARE EDUCATION/TRAINING PROGRAM

## 2025-02-04 PROCEDURE — 76856 US EXAM PELVIC COMPLETE: CPT

## 2025-02-04 PROCEDURE — 76856 US EXAM PELVIC COMPLETE: CPT | Performed by: STUDENT IN AN ORGANIZED HEALTH CARE EDUCATION/TRAINING PROGRAM

## 2025-02-05 NOTE — PROGRESS NOTES
Subjective   Patient ID: Ina Rahman is a 29 y.o. female who presents for Follow-up (Virtual F/U to [place], denies problems.).  9/25/2024 documentation:  She presents as a new patient for gyn visit. She desires annual exam in addition to evaluation of fertility. No prior records are available for review. She does not remember when her last pap was exactly but believes it was in 2021. She has a history of abnormal pap and did have colposcopy performed.     She states she has a history of PCOS and irregular menses. Ultrasound was performed in around 2022 and she believes this was without abnormality. She stopped OCP in 2019 and has had no menses since 2022. This last one lasted one month with heavy flow and she has had no bleeding since. She was on metformin in the distant past but was intolerant of it with GI upset. She has not had ultrasound or lab testing recently.     Her partner has had a child and there is no suspicion for abnormality of semen.   She denies any past pelvic infection to suggest tubal occulusion.    10/23/2024:  Pelvic ultrasound was performed on 10/18/2024 after taking provera 10 mg for 10 days. This returned with normal appearing uterus with 3 mm endometrium and normal appearance of both ovaries.  Labs returned with normal results for TSH, FSH, LH, estradiol, prolactin, fasting insulin and Hgb A1c.   LH:FSH ration is elevated.  Free testosterone was elevated at 18.4 (normal range 0.1-6.4) and total testosterone was elevated at 94 (normal 2-45).    Her labs suggest PCOS and she is a candidate for ovulation induction.    12/11/2024:  This visit was completed via telehealth. All issues as below were discussed and addressed but no physical exam was performed. If it was felt that the patient should be evaluated in the office then they were directed there. The patient verbally consented to the visit.    She took clomiphene citrate 50 mg cycle days 3-7. Pelvic ultrasound was performed on cycle day 14 on  12/4/2024. The largest dominant follicle measured only 1 cm. She states while taking Clomid she had some hot flushes and mood swings. They are having timed intercourse.  We reviewed that the ultrasound suggests she did not ovulate on this clomid dose. We will confirm this with a progesterone level. Plan to increase clomid dose next cycle.     1/8/2025:  This visit was completed via telehealth. All issues as below were discussed and addressed but no physical exam was performed. If it was felt that the patient should be evaluated in the office then they were directed there. The patient verbally consented to the visit.    She presents for follow up after clomid 100 mg cycle days 3-7. Ultrasound was performed on cycle day 14. Largest dominant follicle measured 1.3 cm on right. Endometrium measured 3 mm. She states she was ill with COVID the week prior. She does not believe she had side effects from clomid. They were only able to have intercourse one time around expected ovulation due to her illness. She is willing to increase clomid dose next cycle and we discussed not proceeding with progesterone level given unlikelihood of ovulation.    2/13/2024:  This visit was completed via telehealth. All issues as below were discussed and addressed but no physical exam was performed. If it was felt that the patient should be evaluated in the office then they were directed there. The patient verbally consented to the visit.    Clomid 150 mg was taken on cycle days 3-7. Ultrasound was performed on 2/4/2025. The largest dominant follicle measured 1.2 cm. She did have an increase in bloating and mood swings on the higher dose. We discussed potential to increase to 200 mg next cycle. We also discussed recommendation for [facility] follow up for alternative management of infertility.          Review of Systems   Constitutional:  Negative for activity change.   HENT:  Negative for congestion.    Respiratory:  Negative for apnea and cough.     Cardiovascular:  Negative for chest pain.   Gastrointestinal:  Negative for constipation and diarrhea.   Genitourinary:  Negative for hematuria and vaginal pain.   Musculoskeletal:  Negative for joint swelling.   Neurological:  Negative for dizziness.   Psychiatric/Behavioral:  Negative for agitation.        History reviewed. No pertinent past medical history.   Past Surgical History:   Procedure Laterality Date    SOFT TISSUE CYST EXCISION      face    TEAR DUCT SURGERY Bilateral       No Known Allergies   Current Outpatient Medications on File Prior to Visit   Medication Sig Dispense Refill    medroxyPROGESTERone (Provera) 10 mg tablet Take 1 tablet (10 mg) by mouth once daily. Take 1 tablet by mouth daily for 10 days. This can be started if no spontaneous menses by cycle day 45. 30 tablet 3    rimegepant (NURTEC) 75 mg tablet,disintegrating Take 1 tablet (75 mg) by mouth once daily as needed for migraines. 16 tablet 11     No current facility-administered medications on file prior to visit.        Objective   Physical Exam  Constitutional:       Appearance: Normal appearance.   Neurological:      Mental Status: She is alert and oriented to person, place, and time.   Psychiatric:         Attention and Perception: Attention normal.         Mood and Affect: Mood and affect normal.         Speech: Speech normal.         Behavior: Behavior normal. Behavior is cooperative.           Problem List Items Addressed This Visit       PCOS (polycystic ovarian syndrome)    Overview     Oligomenorrhea. Lab testing returned with elevated LH:FSH ratio and elevated testosterone level.  Clomid ovulation induction is desired.          Relevant Medications    clomiPHENE (Clomid) 50 mg tablet    Other Relevant Orders    Progesterone    Referral to Reproductive Endocrinology    Anovulatory amenorrhea - Primary    Overview     No menses since 2022. Past diagnosis of PCOS.  Normal lab results for TSH, LH, FSH, estradiol, prolactin, Hgb  A1c, fasting insulin. LH:FSH ratio is elevated.  Free and total testosterone are elevated.  10/18/2024 pelvic ultrasound shows a normal sized uterus with thin endometrium measuring 3 mm and no adnexal abnormality.  On clomid 50 mg cycle days 3-7, largest dominant follicle measured 1 cm.   On clomid 100 mg cycle days 3-7, largest dominant follicle measured 1.3 cm.  On clomid 150 mg cycle days 3-7, largest dominant follicle measured 1.2 cm.         Current Assessment & Plan     Progesterone level is ordered to assess for ovulation.  We discussed that we can increase to 200 mg next cycle.   If no pregnancy she is interested in seeing [facility] for further fertility management. Referral is placed.          Relevant Medications    clomiPHENE (Clomid) 50 mg tablet    Other Relevant Orders    Progesterone    Referral to Reproductive Endocrinology

## 2025-02-13 ENCOUNTER — APPOINTMENT (OUTPATIENT)
Dept: OBSTETRICS AND GYNECOLOGY | Facility: CLINIC | Age: 30
End: 2025-02-13
Payer: COMMERCIAL

## 2025-02-13 VITALS — BODY MASS INDEX: 33.74 KG/M2 | HEIGHT: 67 IN | WEIGHT: 215 LBS

## 2025-02-13 DIAGNOSIS — E28.2 PCOS (POLYCYSTIC OVARIAN SYNDROME): ICD-10-CM

## 2025-02-13 DIAGNOSIS — N91.2 ANOVULATORY AMENORRHEA: Primary | ICD-10-CM

## 2025-02-13 PROCEDURE — 1036F TOBACCO NON-USER: CPT | Performed by: OBSTETRICS & GYNECOLOGY

## 2025-02-13 PROCEDURE — RXMED WILLOW AMBULATORY MEDICATION CHARGE

## 2025-02-13 PROCEDURE — 3008F BODY MASS INDEX DOCD: CPT | Performed by: OBSTETRICS & GYNECOLOGY

## 2025-02-13 PROCEDURE — 99214 OFFICE O/P EST MOD 30 MIN: CPT | Performed by: OBSTETRICS & GYNECOLOGY

## 2025-02-13 RX ORDER — CLOMIPHENE CITRATE 50 MG/1
200 TABLET ORAL DAILY
Qty: 20 TABLET | Refills: 0 | Status: SHIPPED | OUTPATIENT
Start: 2025-02-13 | End: 2025-02-19

## 2025-02-13 ASSESSMENT — ENCOUNTER SYMPTOMS
DIARRHEA: 0
ACTIVITY CHANGE: 0
COUGH: 0
AGITATION: 0
APNEA: 0
JOINT SWELLING: 0
HEMATURIA: 0
DIZZINESS: 0
CONSTIPATION: 0

## 2025-02-13 NOTE — ASSESSMENT & PLAN NOTE
Progesterone level is ordered to assess for ovulation.  We discussed that we can increase to 200 mg next cycle.   If no pregnancy she is interested in seeing [facility] for further fertility management. Referral is placed.

## 2025-02-17 ENCOUNTER — APPOINTMENT (OUTPATIENT)
Dept: PRIMARY CARE | Facility: CLINIC | Age: 30
End: 2025-02-17
Payer: COMMERCIAL

## 2025-02-24 ENCOUNTER — TELEPHONE (OUTPATIENT)
Dept: OBSTETRICS AND GYNECOLOGY | Facility: CLINIC | Age: 30
End: 2025-02-24

## 2025-02-24 ENCOUNTER — OFFICE VISIT (OUTPATIENT)
Dept: NEUROLOGY | Facility: HOSPITAL | Age: 30
End: 2025-02-24
Payer: COMMERCIAL

## 2025-02-24 VITALS
BODY MASS INDEX: 33.31 KG/M2 | HEART RATE: 90 BPM | DIASTOLIC BLOOD PRESSURE: 79 MMHG | WEIGHT: 212.7 LBS | SYSTOLIC BLOOD PRESSURE: 110 MMHG

## 2025-02-24 DIAGNOSIS — G43.119 INTRACTABLE MIGRAINE WITH AURA WITHOUT STATUS MIGRAINOSUS: Primary | ICD-10-CM

## 2025-02-24 DIAGNOSIS — E28.9 OVARIAN DYSFUNCTION: ICD-10-CM

## 2025-02-24 PROCEDURE — 99204 OFFICE O/P NEW MOD 45 MIN: CPT | Performed by: PSYCHIATRY & NEUROLOGY

## 2025-02-24 PROCEDURE — 99214 OFFICE O/P EST MOD 30 MIN: CPT | Performed by: PSYCHIATRY & NEUROLOGY

## 2025-02-24 PROCEDURE — RXMED WILLOW AMBULATORY MEDICATION CHARGE

## 2025-02-24 PROCEDURE — 1036F TOBACCO NON-USER: CPT | Performed by: PSYCHIATRY & NEUROLOGY

## 2025-02-24 ASSESSMENT — ENCOUNTER SYMPTOMS
LOSS OF SENSATION IN FEET: 0
DEPRESSION: 0
OCCASIONAL FEELINGS OF UNSTEADINESS: 0

## 2025-02-24 ASSESSMENT — PAIN SCALES - GENERAL: PAINLEVEL_OUTOF10: 0-NO PAIN

## 2025-02-24 NOTE — PATIENT INSTRUCTIONS
Wants to retry Nurtec every other day--erx'd  Other options include amitriptyline/propranolol  Do MRI brain wo  Do HA diary    Rtc 2 mo

## 2025-02-24 NOTE — PROGRESS NOTES
In-clinic visit    Visit type: self referral    PCP: No primary care provider on file..    Santy Rahman is a 29 y.o. year old left handed female who presents with Migraine (Self referral.).    Patient is accompanied by none.     HPI    No PCP    Was seeing [facility] neurology, insurance changed, new employer, now here.    States has had migraines since 2010. Didn't see a neurologist then. HA was spotty. Got worse as she got older. Has PCOS.    Pain starts in back of neck, L side, radiates up side of head to back of L eye and sits there. Used to lose vision. Throbbing. Pulsating. Constant. (+) nausea/vomiting. (+) light sensitivity. HA lasts 2-3 days. At least 10 days a month with HA. Used to be more than half the month with HA.    S/p rizatriptan--no relief  S/p Ubrelvy--no relief  S/p topiramate--no relief  S/p Qulipta--didn't help  S/p ibuprofen--rebound  S/p Emgality--relief for a week then that was it  S/p Aimovig--relief for a week then that was it  Reyvow--prescribed but didn't use, was scared of sleepiness, has at least a 30 min commute one way daily    Previous neurology provider Ismael KEE mentioned Botox might be an option in the future.    Nurtec every other day helped a little in past. Feels as if Nurtec helped the most, of all the things she's tried.    No prior head imaging.    No asthma. No kidney stones. No heart problems. No glaucoma.    (+) eye checks. No problem with eyes contributing to HA.    Has not tried amitriptyline/propranolol. Has some trouble maintaining sleep.    Trying to get pregnant. On hormones.    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    No tobacco. No alcohol. No street drug use.    Review of Systems   Constitutional:  Negative for appetite change, chills and fever.   HENT:  Negative for ear pain and nosebleeds.    Eyes:  Negative for discharge and itching.   Respiratory:  Negative for choking and chest tightness.    Cardiovascular:  Negative for chest pain  and palpitations.   Gastrointestinal:  Negative for abdominal distention, abdominal pain and nausea.   Endocrine: Negative for cold intolerance and heat intolerance.   Genitourinary:  Negative for difficulty urinating and dysuria.   Musculoskeletal:  Negative for gait problem and myalgias.   Neurological:  Negative for dizziness, seizures and numbness.     Patient Active Problem List   Diagnosis    Well woman exam with routine gynecological exam    Anovulatory amenorrhea    PCOS (polycystic ovarian syndrome)    Intractable migraine with aura without status migrainosus       History reviewed. No pertinent past medical history.  Past Surgical History:   Procedure Laterality Date    SOFT TISSUE CYST EXCISION      face    TEAR DUCT SURGERY Bilateral      Social History     Tobacco Use    Smoking status: Never    Smokeless tobacco: Never   Substance Use Topics    Alcohol use: Not Currently     family history includes Breast cancer in her mother's sister.    No Known Allergies    Current Outpatient Medications:     clomiPHENE (Clomid) 50 mg tablet, Take 4 tablets (200 mg) by mouth once daily for 5 days., Disp: 20 tablet, Rfl: 0    medroxyPROGESTERone (Provera) 10 mg tablet, Take 1 tablet (10 mg) by mouth once daily. Take 1 tablet by mouth daily for 10 days. This can be started if no spontaneous menses by cycle day 45., Disp: 30 tablet, Rfl: 3    prenatal vit,calc76-iron-folic (Prenatabs Rx) 29 mg iron- 1 mg tablet, 1 tablet once daily., Disp: , Rfl:     rimegepant (NURTEC) 75 mg tablet,disintegrating, Take 1 tablet (75 mg) by mouth once daily as needed for migraines., Disp: 16 tablet, Rfl: 11    rimegepant (Nurtec ODT) 75 mg tablet,disintegrating, Dissolve 1 tablet (75 mg) in the mouth every other day., Disp: 16 tablet, Rfl: 5    Objective     /79   Pulse 90   Wt 96.5 kg (212 lb 11.2 oz)   LMP 01/22/2025 (Exact Date)   BMI 33.31 kg/m²     No TTP bilat cervical PSP and bilat trapezius    Awake, alert, oriented  x3, in no distress  Well-nourished, ambulatory independently  No leg edema    Mental status exam as above, conversant   Fair fund of knowledge  Recent/remote memory fair  Fair attention span  Pupils round reactive to light, 3-4 mm, (-) RAPD   Fundoscopic examination was attempted but fundus was not visualized bilaterally   Full EOMs intact, no nystagmus, no ptosis   V1 to V3 sensation is intact   No facial droop   Hearing grossly intact   No dysarthria  Good shoulder shrug bilaterally   Tongue is midline     Motor strength is 5/5 on all extremities, tone/bulk normal   Reflexes 2+ on all 4 extremities, downgoing toes bilaterally   Sensation is intact to light touch, vibration on all 4 extremities   Finger to nose test intact bilaterally   Negative Romberg sign   Normal gait       Lab Results   Component Value Date    HGBA1C 5.2 10/02/2024    TSH 2.53 10/02/2024        Assessment/Plan     Migraine with aura, episodic, intractable  Normal neuro exam  No prior brain imaging  S/p rizatriptan--no relief  S/p Ubrelvy--no relief  S/p topiramate--no relief  S/p Qulipta--didn't help  S/p ibuprofen--rebound  S/p Emgality--relief for a week then that was it  S/p Aimovig--relief for a week then that was it  Has tried and failed multiple medications  Discussed and reviewed headache/migraine pathophysiology with patient.    Discussed, unfortunately, no med 100% safe to get pregnant on.  Discussed non-drug therapy including stress reduction, identification and avoidance as much as possible of precipitating factors. Avoid smoking/alcohol intake. Try to get good night sleep.   The current headache abortive medication regimen prescribed seem to be helping at this time (Nurtec).   States she still has a lot of Nurtec at home.  We discussed about daily preventive medical therapy.  We had discussions about propranolol and amitriptyline. Possible side effects were discussed as well.   Pt wants to try Nurtec every other day (preventative,  scheduled).  Also discussed about potential reproductive and fetal effects of antiepileptic drug use in a reproductive age woman, and the patient understands and accepts the risk.    Plans:  Wants to retry Nurtec every other day--erx'd  Other options include amitriptyline/propranolol  Do MRI brain wo  Do HA diary    Rtc 2 mo    All questions were answered.  Pt knows how to contact my office in case pt has any questions or concerns.    Theodore Diallo MD

## 2025-02-26 ENCOUNTER — PHARMACY VISIT (OUTPATIENT)
Dept: PHARMACY | Facility: CLINIC | Age: 30
End: 2025-02-26
Payer: MEDICARE

## 2025-03-08 ENCOUNTER — HOSPITAL ENCOUNTER (OUTPATIENT)
Dept: RADIOLOGY | Facility: HOSPITAL | Age: 30
Discharge: HOME | End: 2025-03-08
Payer: COMMERCIAL

## 2025-03-08 DIAGNOSIS — E28.9 OVARIAN DYSFUNCTION: ICD-10-CM

## 2025-03-08 PROCEDURE — 76856 US EXAM PELVIC COMPLETE: CPT

## 2025-03-08 PROCEDURE — 76856 US EXAM PELVIC COMPLETE: CPT | Performed by: STUDENT IN AN ORGANIZED HEALTH CARE EDUCATION/TRAINING PROGRAM

## 2025-03-08 PROCEDURE — 76830 TRANSVAGINAL US NON-OB: CPT | Performed by: STUDENT IN AN ORGANIZED HEALTH CARE EDUCATION/TRAINING PROGRAM

## 2025-03-10 ENCOUNTER — ANCILLARY PROCEDURE (OUTPATIENT)
Facility: HOSPITAL | Age: 30
End: 2025-03-10
Payer: COMMERCIAL

## 2025-03-10 DIAGNOSIS — G43.119 INTRACTABLE MIGRAINE WITH AURA WITHOUT STATUS MIGRAINOSUS: ICD-10-CM

## 2025-03-10 PROCEDURE — 70551 MRI BRAIN STEM W/O DYE: CPT | Performed by: STUDENT IN AN ORGANIZED HEALTH CARE EDUCATION/TRAINING PROGRAM

## 2025-03-10 PROCEDURE — 70551 MRI BRAIN STEM W/O DYE: CPT

## 2025-03-12 ENCOUNTER — TELEPHONE (OUTPATIENT)
Dept: OBSTETRICS AND GYNECOLOGY | Facility: CLINIC | Age: 30
End: 2025-03-12
Payer: COMMERCIAL

## 2025-03-12 NOTE — TELEPHONE ENCOUNTER
Patient has questions re: clomid  Had US 3/8/25  Next gyn follow up appt not until 3/26  Believes she needs seen sooner or telehealth sooner?

## 2025-03-17 NOTE — PROGRESS NOTES
Subjective   Patient ID: Ina Rahman is a 29 y.o. female who presents for Virtual Visit (Follow up US, clomid).  9/25/2024 documentation:  She presents as a new patient for gyn visit. She desires annual exam in addition to evaluation of fertility. No prior records are available for review. She does not remember when her last pap was exactly but believes it was in 2021. She has a history of abnormal pap and did have colposcopy performed.     She states she has a history of PCOS and irregular menses. Ultrasound was performed in around 2022 and she believes this was without abnormality. She stopped OCP in 2019 and has had no menses since 2022. This last one lasted one month with heavy flow and she has had no bleeding since. She was on metformin in the distant past but was intolerant of it with GI upset. She has not had ultrasound or lab testing recently.     Her partner has had a child and there is no suspicion for abnormality of semen.   She denies any past pelvic infection to suggest tubal occulusion.    10/23/2024:  Pelvic ultrasound was performed on 10/18/2024 after taking provera 10 mg for 10 days. This returned with normal appearing uterus with 3 mm endometrium and normal appearance of both ovaries.  Labs returned with normal results for TSH, FSH, LH, estradiol, prolactin, fasting insulin and Hgb A1c.   LH:FSH ration is elevated.  Free testosterone was elevated at 18.4 (normal range 0.1-6.4) and total testosterone was elevated at 94 (normal 2-45).    Her labs suggest PCOS and she is a candidate for ovulation induction.    12/11/2024:  This visit was completed via telehealth. All issues as below were discussed and addressed but no physical exam was performed. If it was felt that the patient should be evaluated in the office then they were directed there. The patient verbally consented to the visit.    She took clomiphene citrate 50 mg cycle days 3-7. Pelvic ultrasound was performed on cycle day 14 on 12/4/2024.  The largest dominant follicle measured only 1 cm. She states while taking Clomid she had some hot flushes and mood swings. They are having timed intercourse.  We reviewed that the ultrasound suggests she did not ovulate on this clomid dose. We will confirm this with a progesterone level. Plan to increase clomid dose next cycle.     1/8/2025:  This visit was completed via telehealth. All issues as below were discussed and addressed but no physical exam was performed. If it was felt that the patient should be evaluated in the office then they were directed there. The patient verbally consented to the visit.    She presents for follow up after clomid 100 mg cycle days 3-7. Ultrasound was performed on cycle day 14. Largest dominant follicle measured 1.3 cm on right. Endometrium measured 3 mm. She states she was ill with COVID the week prior. She does not believe she had side effects from clomid. They were only able to have intercourse one time around expected ovulation due to her illness. She is willing to increase clomid dose next cycle and we discussed not proceeding with progesterone level given unlikelihood of ovulation.    2/13/2024:  This visit was completed via telehealth. All issues as below were discussed and addressed but no physical exam was performed. If it was felt that the patient should be evaluated in the office then they were directed there. The patient verbally consented to the visit.    Clomid 150 mg was taken on cycle days 3-7. Ultrasound was performed on 2/4/2025. The largest dominant follicle measured 1.2 cm. She did have an increase in bloating and mood swings on the higher dose. We discussed potential to increase to 200 mg next cycle. We also discussed recommendation for GENESIS follow up for alternative management of infertility.    3/19/2025:  This visit was completed via telehealth. All issues as below were discussed and addressed but no physical exam was performed. If it was felt that the patient  should be evaluated in the office then they were directed there. The patient verbally consented to the visit.    Clomid 200 mg was taken on cycle days 3-7. Ultrasound was performed on 3/8/2025. There was no dominant follicle noted. Largest follicle bilaterally was 0.8 cm. While taking clomid she had no side effects or concerns. She is frustrated with lack of results.   GENESIS consultation is scheduled for July 2025. We discussed option of semen analysis prior to this, which she declines due to schedule challenges with her . She will stay on prenatal vitamins also.           Review of Systems   Constitutional:  Negative for activity change.   HENT:  Negative for congestion.    Respiratory:  Negative for apnea and cough.    Cardiovascular:  Negative for chest pain.   Gastrointestinal:  Negative for constipation and diarrhea.   Genitourinary:  Negative for hematuria and vaginal pain.   Musculoskeletal:  Negative for joint swelling.   Neurological:  Negative for dizziness.   Psychiatric/Behavioral:  Negative for agitation.        History reviewed. No pertinent past medical history.   Past Surgical History:   Procedure Laterality Date    SOFT TISSUE CYST EXCISION      face    TEAR DUCT SURGERY Bilateral       No Known Allergies   Current Outpatient Medications on File Prior to Visit   Medication Sig Dispense Refill    medroxyPROGESTERone (Provera) 10 mg tablet Take 1 tablet (10 mg) by mouth once daily. Take 1 tablet by mouth daily for 10 days. This can be started if no spontaneous menses by cycle day 45. 30 tablet 3    prenatal vit,calc76-iron-folic (Prenatabs Rx) 29 mg iron- 1 mg tablet 1 tablet once daily.      rimegepant (NURTEC) 75 mg tablet,disintegrating Take 1 tablet (75 mg) by mouth once daily as needed for migraines. 16 tablet 11    rimegepant (Nurtec ODT) 75 mg tablet,disintegrating Dissolve 1 tablet (75 mg) in the mouth every other day. 16 tablet 5     No current facility-administered medications on file  prior to visit.        Objective   Physical Exam  Constitutional:       Appearance: Normal appearance.   Pulmonary:      Effort: Pulmonary effort is normal.   Neurological:      Mental Status: She is alert and oriented to person, place, and time.   Psychiatric:         Attention and Perception: Attention normal.         Mood and Affect: Mood and affect normal.         Speech: Speech normal.         Behavior: Behavior normal. Behavior is cooperative.           Problem List Items Addressed This Visit       PCOS (polycystic ovarian syndrome)    Overview     Oligomenorrhea. Lab testing returned with elevated LH:FSH ratio and elevated testosterone level.  Clomid ovulation induction attempts with clomid did not result in dominant follicle development.          Anovulatory amenorrhea - Primary    Overview     No menses since 2022. Past diagnosis of PCOS.  Normal lab results for TSH, LH, FSH, estradiol, prolactin, Hgb A1c, fasting insulin. LH:FSH ratio is elevated.  Free and total testosterone are elevated.  10/18/2024 pelvic ultrasound shows a normal sized uterus with thin endometrium measuring 3 mm and no adnexal abnormality.  On clomid 50 mg cycle days 3-7, largest dominant follicle measured 1 cm.   On clomid 100 mg cycle days 3-7, largest dominant follicle measured 1.3 cm.  On clomid 150 mg cycle days 3-7, largest dominant follicle measured 1.2 cm.  On clomid 200 mg cycle days 3-7, largest follicle measures 0.8 cm.          Current Assessment & Plan     GENESIS evaluation is scheduled for in a few months. She will continue prenatal vitamins.

## 2025-03-19 ENCOUNTER — APPOINTMENT (OUTPATIENT)
Dept: OBSTETRICS AND GYNECOLOGY | Facility: CLINIC | Age: 30
End: 2025-03-19
Payer: COMMERCIAL

## 2025-03-19 VITALS — WEIGHT: 212 LBS | BODY MASS INDEX: 33.2 KG/M2

## 2025-03-19 DIAGNOSIS — N91.2 ANOVULATORY AMENORRHEA: Primary | ICD-10-CM

## 2025-03-19 DIAGNOSIS — E28.2 PCOS (POLYCYSTIC OVARIAN SYNDROME): ICD-10-CM

## 2025-03-19 PROCEDURE — 99213 OFFICE O/P EST LOW 20 MIN: CPT | Performed by: OBSTETRICS & GYNECOLOGY

## 2025-03-19 PROCEDURE — 1036F TOBACCO NON-USER: CPT | Performed by: OBSTETRICS & GYNECOLOGY

## 2025-03-19 ASSESSMENT — ENCOUNTER SYMPTOMS
DIARRHEA: 0
HEMATURIA: 0
ACTIVITY CHANGE: 0
COUGH: 0
APNEA: 0
DIZZINESS: 0
JOINT SWELLING: 0
AGITATION: 0
CONSTIPATION: 0

## 2025-03-31 ENCOUNTER — APPOINTMENT (OUTPATIENT)
Dept: URGENT CARE | Age: 30
End: 2025-03-31
Payer: COMMERCIAL

## 2025-04-14 ENCOUNTER — PHARMACY VISIT (OUTPATIENT)
Dept: PHARMACY | Facility: CLINIC | Age: 30
End: 2025-04-14
Payer: MEDICARE

## 2025-04-14 PROCEDURE — RXMED WILLOW AMBULATORY MEDICATION CHARGE

## 2025-04-16 ENCOUNTER — PHARMACY VISIT (OUTPATIENT)
Dept: PHARMACY | Facility: CLINIC | Age: 30
End: 2025-04-16
Payer: COMMERCIAL

## 2025-04-16 PROCEDURE — RXOTC WILLOW AMBULATORY OTC CHARGE

## 2025-05-05 ENCOUNTER — TELEMEDICINE (OUTPATIENT)
Dept: NEUROLOGY | Facility: HOSPITAL | Age: 30
End: 2025-05-05
Payer: COMMERCIAL

## 2025-05-05 ENCOUNTER — PHARMACY VISIT (OUTPATIENT)
Dept: PHARMACY | Facility: CLINIC | Age: 30
End: 2025-05-05
Payer: MEDICARE

## 2025-05-05 DIAGNOSIS — G43.119 INTRACTABLE MIGRAINE WITH AURA WITHOUT STATUS MIGRAINOSUS: Primary | ICD-10-CM

## 2025-05-05 PROCEDURE — RXMED WILLOW AMBULATORY MEDICATION CHARGE

## 2025-05-05 PROCEDURE — 99214 OFFICE O/P EST MOD 30 MIN: CPT | Performed by: PSYCHIATRY & NEUROLOGY

## 2025-05-05 PROCEDURE — 1036F TOBACCO NON-USER: CPT | Performed by: PSYCHIATRY & NEUROLOGY

## 2025-05-05 RX ORDER — AMITRIPTYLINE HYDROCHLORIDE 25 MG/1
25 TABLET, FILM COATED ORAL NIGHTLY
Qty: 30 TABLET | Refills: 2 | Status: SHIPPED | OUTPATIENT
Start: 2025-05-05

## 2025-05-05 NOTE — PROGRESS NOTES
Follow-up visit    Visit type: Virtual follow-up    PCP: No primary care provider on file..    Subjective     Ina Kam Rahman is a 29 y.o. year old female here for virtual follow-up. Last seen 2/24/2025.    Patient is accompanied by none.     Telehealth visit today. The patient was informed about the telehealth clinical encounter including benefits to avoiding travel, limitations of the assessment, and billing for the service. In-office care may be recommended if needed. Telehealth sessions are not being recorded and personal health information is protected. All questions were answered and verbal consent from the patient (or guardian) was obtained to proceed. Patient verbally confirmed, patient physically in [place].       HPI    I first saw her 2/24/25 for migraines. No PCP. Was seeing [facility] neurology, insurance changed, new employer, now here. States has had migraines since 2010. Didn't see a neurologist then. HA was spotty. Got worse as she got older. Has PCOS. Pain starts in back of neck, L side, radiates up side of head to back of L eye and sits there. Used to lose vision. Throbbing. Pulsating. Constant. (+) nausea/vomiting. (+) light sensitivity. HA lasts 2-3 days. At least 10 days a month with HA. Used to be more than half the month with HA.  S/p rizatriptan--no relief  S/p Ubrelvy--no relief  S/p topiramate--no relief  S/p Qulipta--didn't help  S/p ibuprofen--rebound  S/p Emgality--relief for a week then that was it  S/p Aimovig--relief for a week then that was it  Reyvow--prescribed but didn't use, was scared of sleepiness, has at least a 30 min commute one way daily  Previous neurology provider Ismael KEE mentioned Botox might be an option in the future. Nurtec every other day helped a little in past. Feels as if Nurtec helped the most, of all the things she's tried. No prior head imaging. No asthma. No kidney stones. No heart problems. No glaucoma. (+) eye checks. No problem with eyes  contributing to HA. Has not tried amitriptyline/propranolol. Has some trouble maintaining sleep. Trying to get pregnant. On hormones. 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 No tobacco. No alcohol. No street drug use. During last visit, discussed med options. Pt wanted to retry Nurtec every other day. Also advised to do MRI brain wo.    Since last visit, MRI brain was normal.    Here today for follow-up.    States has had a lot of HA, even when taking Nurtec every other day. Sometimes has to take it daily. Nurtec does help some. Denies SE. She is interested in possibly trying other medication.     Problem List[1]    Allergies[2]  Current Medications[3]     Objective     There were no vitals taken for this visit.       Awake, alert, oriented x3, in no distress  Well-nourished, seated    Mental status exam as above, conversant   Full EOMs intact, no nystagmus, no ptosis   No facial droop   Hearing grossly intact   No dysarthria    I did not have her stand or walk      Lab Results   Component Value Date    HGBA1C 5.2 10/02/2024    TSH 2.53 10/02/2024        MR brain wo IV contrast 03/10/2025    Narrative  Interpreted By:  pablo lundberg,  and Naomi Murillo  STUDY:  MR BRAIN WO IV CONTRAST;  3/10/2025 4:18 pm    INDICATION:  Signs/Symptoms:intractable headaches.    ,G43.119 Migraine with aura, intractable, without status migrainosus    HISTORY:  29-year-old female with past medical history of because and a 15 year  history of left-sided migraines associated nausea vomiting, lead  sensitivity, and intermittently visualized. Examination day for  follow up.    COMPARISON:  None.    ACCESSION NUMBER(S):  [id number]    ORDERING CLINICIAN:  JACK KHAN    TECHNIQUE:  Axial T2, FLAIR, DWI, gradient echo T2 and sagittal and coronal T1  weighted images of brain were acquired.    FINDINGS:  Midline structures are present and intact.    CSF Spaces: The ventricles, sulci and basal cisterns are within  normal limits.    Parenchyma:  There is no diffusion restriction abnormality to suggest  acute infarct.  There is no focal parenchymal signal abnormality.  There is no mass effect or midline shift.    Paranasal Sinuses and Mastoids: Visualized paranasal sinuses and  mastoid air cells are unremarkable.    Impression  No evidence of acute infarct, intracranial mass effect or midline  shift.    I personally reviewed the images/study and I agree with the findings  as stated by Resident Dr. Lidia Salgado MD. This study was  interpreted at [facility],  [place], [place].    MACRO:  None    Signed by: pablo lundberg 3/11/2025 3:52 PM  Dictation workstation:   [id number]      Assessment/Plan     Migraine with aura, episodic, intractable  Normal neuro exam  2025 MRI brain normal  S/p rizatriptan--no relief  S/p Ubrelvy--no relief  S/p topiramate--no relief  S/p Qulipta--didn't help  S/p ibuprofen--rebound  S/p Emgality--relief for a week then that was it  S/p Aimovig--relief for a week then that was it  Has tried and failed multiple medications  On Nurtec every other day--helping some, but still with significant HA  Prev discussed, unfortunately, no med 100% safe to get pregnant on.  We again had discussions about trying amitriptyline. Possible side effects were discussed as well.        Plans:  Continue Nurtec every other day for now--? Switch to prn in future  Add amitriptyline 25mg at bedtime    Rtc 2 mo    All questions were answered.  Pt knows how to contact my office in case pt has any questions or concerns.    Theodore Diallo MD              [1]   Patient Active Problem List  Diagnosis    Well woman exam with routine gynecological exam    Anovulatory amenorrhea    PCOS (polycystic ovarian syndrome)    Intractable migraine with aura without status migrainosus   [2] No Known Allergies  [3]   Current Outpatient Medications:     medroxyPROGESTERone (Provera) 10 mg tablet, Take 1 tablet (10 mg) by mouth once daily. Take 1  tablet by mouth daily for 10 days. This can be started if no spontaneous menses by cycle day 45., Disp: 30 tablet, Rfl: 3    prenatal vit,calc76-iron-folic (Prenatabs Rx) 29 mg iron- 1 mg tablet, 1 tablet once daily., Disp: , Rfl:     rimegepant (Nurtec ODT) 75 mg tablet,disintegrating, Dissolve 1 tablet (75 mg) in the mouth every other day., Disp: 16 tablet, Rfl: 5    rimegepant (NURTEC) 75 mg tablet,disintegrating, Take 1 tablet (75 mg) by mouth once daily as needed for migraines., Disp: 16 tablet, Rfl: 11

## 2025-05-05 NOTE — PATIENT INSTRUCTIONS
Continue Nurtec every other day for now--? Switch to prn in future  Add amitriptyline 25mg at bedtime    Rtc 2 mo

## 2025-05-10 PROCEDURE — RXMED WILLOW AMBULATORY MEDICATION CHARGE

## 2025-05-15 ENCOUNTER — PHARMACY VISIT (OUTPATIENT)
Dept: PHARMACY | Facility: CLINIC | Age: 30
End: 2025-05-15
Payer: MEDICARE

## 2025-06-17 ENCOUNTER — TELEPHONE (OUTPATIENT)
Dept: OBSTETRICS AND GYNECOLOGY | Facility: CLINIC | Age: 30
End: 2025-06-17
Payer: COMMERCIAL

## 2025-06-17 DIAGNOSIS — R31.9 HEMATURIA, UNSPECIFIED TYPE: ICD-10-CM

## 2025-06-17 NOTE — TELEPHONE ENCOUNTER
Patient called stating she feels like she has a UTI.  She wants to know if she can get a U/A because she works at [employer].  Please advise.

## 2025-06-17 NOTE — TELEPHONE ENCOUNTER
Spoke with patient, c/o cramping and blood in the urine.  She would like to go to lab to give specimen today

## 2025-06-19 ENCOUNTER — PHARMACY VISIT (OUTPATIENT)
Dept: PHARMACY | Facility: CLINIC | Age: 30
End: 2025-06-19
Payer: MEDICARE

## 2025-06-19 ENCOUNTER — TELEPHONE (OUTPATIENT)
Dept: OBSTETRICS AND GYNECOLOGY | Facility: CLINIC | Age: 30
End: 2025-06-19
Payer: COMMERCIAL

## 2025-06-19 DIAGNOSIS — R30.0 DYSURIA: Primary | ICD-10-CM

## 2025-06-19 LAB — BACTERIA UR CULT: ABNORMAL

## 2025-06-19 PROCEDURE — RXMED WILLOW AMBULATORY MEDICATION CHARGE

## 2025-06-19 RX ORDER — SULFAMETHOXAZOLE AND TRIMETHOPRIM 800; 160 MG/1; MG/1
1 TABLET ORAL 2 TIMES DAILY
Qty: 10 TABLET | Refills: 0 | Status: SHIPPED | OUTPATIENT
Start: 2025-06-19 | End: 2025-06-19 | Stop reason: ENTERED-IN-ERROR

## 2025-06-19 RX ORDER — NITROFURANTOIN 25; 75 MG/1; MG/1
100 CAPSULE ORAL 2 TIMES DAILY
Qty: 14 CAPSULE | Refills: 0 | Status: SHIPPED | OUTPATIENT
Start: 2025-06-19 | End: 2025-06-26

## 2025-06-19 NOTE — TELEPHONE ENCOUNTER
Patient called in , another task was created patient requesting results.   Urine culture shows E-coli. I informed patient that results were waiting to be reviewed by a provider, that it does show an infection.

## 2025-06-19 NOTE — TELEPHONE ENCOUNTER
Pt notified of result of urine culture and that Dr. Alonso sent Bactrim DS to her pharmacy. Pt states she can't swallow large pills like bactrim and requests macrobid. Will review request with Dr. Alonso.

## 2025-06-23 ENCOUNTER — OFFICE VISIT (OUTPATIENT)
Dept: URGENT CARE | Age: 30
End: 2025-06-23
Payer: COMMERCIAL

## 2025-06-23 ENCOUNTER — PHARMACY VISIT (OUTPATIENT)
Dept: PHARMACY | Facility: CLINIC | Age: 30
End: 2025-06-23
Payer: MEDICARE

## 2025-06-23 VITALS
HEART RATE: 98 BPM | SYSTOLIC BLOOD PRESSURE: 139 MMHG | OXYGEN SATURATION: 98 % | WEIGHT: 203 LBS | BODY MASS INDEX: 31.79 KG/M2 | RESPIRATION RATE: 20 BRPM | DIASTOLIC BLOOD PRESSURE: 99 MMHG

## 2025-06-23 DIAGNOSIS — R31.9 URINARY TRACT INFECTION WITH HEMATURIA, SITE UNSPECIFIED: ICD-10-CM

## 2025-06-23 DIAGNOSIS — N39.0 URINARY TRACT INFECTION WITH HEMATURIA, SITE UNSPECIFIED: ICD-10-CM

## 2025-06-23 LAB
CHLAMYDIA TRACHOMATIS: NOT DETECTED
NEISSERIA GONORRHOEAE: NOT DETECTED
POC APPEARANCE, URINE: CLEAR
POC BILIRUBIN, URINE: NEGATIVE
POC BLOOD, URINE: ABNORMAL
POC COLOR, URINE: YELLOW
POC GLUCOSE, URINE: NEGATIVE MG/DL
POC KETONES, URINE: NEGATIVE MG/DL
POC LEUKOCYTES, URINE: NEGATIVE
POC NITRITE,URINE: NEGATIVE
POC PH, URINE: 6 PH
POC PROTEIN, URINE: NEGATIVE MG/DL
POC SPECIFIC GRAVITY, URINE: 1.01
POC UROBILINOGEN, URINE: 0.2 EU/DL

## 2025-06-23 PROCEDURE — 81003 URINALYSIS AUTO W/O SCOPE: CPT | Performed by: NURSE PRACTITIONER

## 2025-06-23 PROCEDURE — 87801 DETECT AGNT MULT DNA AMPLI: CPT | Performed by: NURSE PRACTITIONER

## 2025-06-23 PROCEDURE — 1036F TOBACCO NON-USER: CPT | Performed by: NURSE PRACTITIONER

## 2025-06-23 PROCEDURE — 99204 OFFICE O/P NEW MOD 45 MIN: CPT | Performed by: NURSE PRACTITIONER

## 2025-06-23 PROCEDURE — RXMED WILLOW AMBULATORY MEDICATION CHARGE

## 2025-06-23 RX ORDER — FLUCONAZOLE 150 MG/1
150 TABLET ORAL
Qty: 2 TABLET | Refills: 0 | Status: SHIPPED | OUTPATIENT
Start: 2025-06-23

## 2025-06-23 RX ORDER — SULFAMETHOXAZOLE AND TRIMETHOPRIM 800; 160 MG/1; MG/1
1 TABLET ORAL 2 TIMES DAILY
Qty: 20 TABLET | Refills: 0 | Status: SHIPPED | OUTPATIENT
Start: 2025-06-23

## 2025-06-23 NOTE — PROGRESS NOTES
Subjective   Patient ID: Ina Rahman is a 29 y.o. female. They present today with a chief complaint of Difficulty Urinating (X1wk, urgency, blood/ mucus, pt is currently on Macrobid but pt believes its not working).    History of Present Illness  Patient presents with continued urinary difficulty and urgency.  She was treated for a UTI with Macrobid on Thursday.  She has no improvement.  C/o a mucous/bloody discharge.      Past Medical History  Allergies as of 06/23/2025    (No Known Allergies)       Prescriptions Prior to Admission[1]     Medical History[2]    Surgical History[3]     reports that she has never smoked. She has never used smokeless tobacco. She reports that she does not currently use alcohol. She reports that she does not use drugs.    Review of Systems  Review of Systems     See HPI                          Objective    Vitals:    06/23/25 1622   BP: (!) 139/99   Pulse: 98   Resp: 20   SpO2: 98%   Weight: 92.1 kg (203 lb)     No LMP recorded.    Physical Exam  Constitutional:       Appearance: Normal appearance.   Cardiovascular:      Rate and Rhythm: Normal rate and regular rhythm.      Pulses: Normal pulses.      Heart sounds: Normal heart sounds.   Pulmonary:      Effort: Pulmonary effort is normal.      Breath sounds: Normal breath sounds.   Abdominal:      General: Abdomen is flat. Bowel sounds are normal.      Palpations: Abdomen is soft.     Patient did a self swab.    Procedures    Point of Care Test & Imaging Results from this visit  Results for orders placed or performed in visit on 06/23/25   POCT UA Automated manually resulted   Result Value Ref Range    POC Color, Urine Yellow Straw, Yellow, Light-Yellow    POC Appearance, Urine Clear Clear    POC Glucose, Urine NEGATIVE NEGATIVE mg/dl    POC Bilirubin, Urine NEGATIVE NEGATIVE    POC Ketones, Urine NEGATIVE NEGATIVE mg/dl    POC Specific Gravity, Urine 1.010 1.005 - 1.035    POC Blood, Urine SMALL (1+) (A) NEGATIVE    POC PH,  Urine 6.0 No Reference Range Established PH    POC Protein, Urine NEGATIVE NEGATIVE mg/dl    POC Urobilinogen, Urine 0.2 0.2, 1.0 EU/DL    Poc Nitrite, Urine NEGATIVE NEGATIVE    POC Leukocytes, Urine NEGATIVE NEGATIVE      Imaging  No results found.    Cardiology, Vascular, and Other Imaging  No other imaging results found for the past 2 days      Diagnostic study results (if any) were reviewed by MATTHEW Gee.    Assessment/Plan   Allergies, medications, history, and pertinent labs/EKGs/Imaging reviewed by MATTHEW Gee.     Medical Decision Making  At time of discharge patient was clinically well-appearing and HDS for outpatient management. The patient and/or family was educated regarding diagnosis, supportive care, OTC and Rx medications. The patient and/or family was given the opportunity to ask questions prior to discharge.  They verbalized understanding of my discussion of the plans for treatment, expected course, indications to return to  or seek further evaluation in ED, and the need for timely follow up as directed.   They were provided with a work/school excuse if requested.      Orders and Diagnoses  Diagnoses and all orders for this visit:  Urinary tract infection with hematuria, site unspecified  -     POCT UA Automated manually resulted  -     sulfamethoxazole-trimethoprim (Bactrim DS) 800-160 mg tablet; Take 1 tablet by mouth 2 times a day.  -     fluconazole (Diflucan) 150 mg tablet; Take 1 tablet (150 mg) by mouth every 3 days.  -     POCT WS CHLAMYDIA GONORRHEA PCR BINX manually resulted      Medical Admin Record      Patient disposition: Home    Electronically signed by MATTHEW Gee  5:35 PM           [1] (Not in a hospital admission)   [2] History reviewed. No pertinent past medical history.  [3]   Past Surgical History:  Procedure Laterality Date    SOFT TISSUE CYST EXCISION      face    TEAR DUCT SURGERY Bilateral

## 2025-07-14 ENCOUNTER — TELEMEDICINE (OUTPATIENT)
Dept: NEUROLOGY | Facility: HOSPITAL | Age: 30
End: 2025-07-14
Payer: COMMERCIAL

## 2025-07-14 DIAGNOSIS — G43.119 INTRACTABLE MIGRAINE WITH AURA WITHOUT STATUS MIGRAINOSUS: Primary | ICD-10-CM

## 2025-07-14 PROCEDURE — 1036F TOBACCO NON-USER: CPT | Performed by: PSYCHIATRY & NEUROLOGY

## 2025-07-14 PROCEDURE — 99213 OFFICE O/P EST LOW 20 MIN: CPT | Performed by: PSYCHIATRY & NEUROLOGY

## 2025-07-14 NOTE — PROGRESS NOTES
Telehealth visit    Visit type: Virtual follow-up    PCP: No primary care provider on file..    Subjective     Ina Kam Rahman is a 29 y.o. year old female here for virtual follow-up. Last seen 5/5/2025.    Patient is accompanied by none.     Telehealth visit today. The patient was informed about the telehealth clinical encounter including benefits to avoiding travel, limitations of the assessment, and billing for the service. In-office care may be recommended if needed. Telehealth sessions are not being recorded and personal health information is protected. All questions were answered and verbal consent from the patient (or guardian) was obtained to proceed. Patient verbally confirmed, patient physically in [place].       HPI    I first saw her 2/24/25 for migraines. No PCP. Was seeing [facility] neurology, insurance changed, new employer, now here. States has had migraines since 2010. Didn't see a neurologist then. HA was spotty. Got worse as she got older. Has PCOS. Pain starts in back of neck, L side, radiates up side of head to back of L eye and sits there. Used to lose vision. Throbbing. Pulsating. Constant. (+) nausea/vomiting. (+) light sensitivity. HA lasts 2-3 days. At least 10 days a month with HA. Used to be more than half the month with HA.  S/p rizatriptan--no relief  S/p Ubrelvy--no relief  S/p topiramate--no relief  S/p Qulipta--didn't help  S/p ibuprofen--rebound  S/p Emgality--relief for a week then that was it  S/p Aimovig--relief for a week then that was it  Reyvow--prescribed but didn't use, was scared of sleepiness, has at least a 30 min commute one way daily  Previous neurology provider Ismael KEE mentioned Botox might be an option in the future. Nurtec every other day helped a little in past. Feels as if Nurtec helped the most, of all the things she's tried. No prior head imaging. No asthma. No kidney stones. No heart problems. No glaucoma. (+) eye checks. No problem with eyes  contributing to HA. Has not tried amitriptyline/propranolol. Has some trouble maintaining sleep. Trying to get pregnant. On hormones. 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 No tobacco. No alcohol. No street drug use. Pt wanted to retry Nurtec every other day. MRI brain was normal. Last visit was to continue Nurtec every other day, and to add amitriptyline 25mg at bedtime.    Here today for follow-up.    She self-discontinued amitriptyline. Was getting groggy. Just on Nurtec every other day at this time. HA control decent. Has less stress in her life these days--says she's getting . Her HA are better because of this. She is happy with HA control currently and doesn't want to change.       Problem List[1]    Allergies[2]  Current Medications[3]     Objective     There were no vitals taken for this visit.--telehealth visit      Awake, alert, oriented x3, in no distress  Well-nourished, seated    Mental status exam as above, conversant   No facial droop   Hearing grossly intact   No dysarthria    I did not have her stand or walk      Lab Results   Component Value Date    HGBA1C 5.2 10/02/2024    TSH 2.53 10/02/2024        Assessment/Plan     Migraine with aura, episodic, intractable  Normal neuro exam  2025 MRI brain normal  S/p rizatriptan--no relief  S/p Ubrelvy--no relief  S/p topiramate--no relief  S/p Qulipta--didn't help  S/p ibuprofen--rebound  S/p amitriptyline--groggy  S/p Emgality--relief for a week then that was it  S/p Aimovig--relief for a week then that was it  Has tried and failed multiple medications  On Nurtec every other day--helping some  Prev discussed, unfortunately, no med 100% safe to get pregnant on.  Now getting , states stress in life less, having less HA even just on Nurtec every other day and happy to continue       Plans:  Continue Nurtec every other day    Rtc 12 mo    All questions were answered.  Pt knows how to contact my office in case pt has any questions or  concerns.    Theodore Diallo MD                [1]   Patient Active Problem List  Diagnosis    Well woman exam with routine gynecological exam    Anovulatory amenorrhea    PCOS (polycystic ovarian syndrome)    Intractable migraine with aura without status migrainosus   [2] No Known Allergies  [3]   Current Outpatient Medications:     rimegepant (Nurtec ODT) 75 mg tablet,disintegrating, Dissolve 1 tablet (75 mg) in the mouth every other day., Disp: , Rfl:

## 2025-07-16 ENCOUNTER — TELEPHONE (OUTPATIENT)
Dept: NEUROLOGY | Facility: HOSPITAL | Age: 30
End: 2025-07-16
Payer: COMMERCIAL

## 2025-07-16 NOTE — TELEPHONE ENCOUNTER
Rx Refill Request Telephone Encounter    Name:  Ina Rahman  :  [id number]  Medication Name:  nurtec 75 qod            Specific Pharmacy location:  [facility]  Date of last appointment:    Date of next appointment:    Best number to reach patient:

## 2025-07-17 DIAGNOSIS — G43.119 INTRACTABLE MIGRAINE WITH AURA WITHOUT STATUS MIGRAINOSUS: Primary | ICD-10-CM

## 2025-07-17 PROCEDURE — RXMED WILLOW AMBULATORY MEDICATION CHARGE

## 2025-07-24 ENCOUNTER — LAB (OUTPATIENT)
Dept: LAB | Facility: HOSPITAL | Age: 30
End: 2025-07-24
Payer: COMMERCIAL

## 2025-07-24 LAB — COTININE UR QL SCN: NEGATIVE

## 2025-07-25 ENCOUNTER — PHARMACY VISIT (OUTPATIENT)
Dept: PHARMACY | Facility: CLINIC | Age: 30
End: 2025-07-25
Payer: COMMERCIAL

## 2025-07-25 ENCOUNTER — APPOINTMENT (OUTPATIENT)
Dept: ENDOCRINOLOGY | Facility: CLINIC | Age: 30
End: 2025-07-25
Payer: COMMERCIAL

## 2025-07-25 PROCEDURE — RXOTC WILLOW AMBULATORY OTC CHARGE

## 2025-08-13 RX ORDER — ONDANSETRON 4 MG/1
4 TABLET, ORALLY DISINTEGRATING ORAL EVERY 12 HOURS PRN
COMMUNITY
Start: 2025-05-27

## 2025-08-14 ENCOUNTER — APPOINTMENT (OUTPATIENT)
Dept: GASTROENTEROLOGY | Facility: CLINIC | Age: 30
End: 2025-08-14
Payer: COMMERCIAL

## 2025-08-14 VITALS — BODY MASS INDEX: 32.3 KG/M2 | HEART RATE: 88 BPM | WEIGHT: 201 LBS | HEIGHT: 66 IN

## 2025-08-14 DIAGNOSIS — R19.7 DIARRHEA, UNSPECIFIED TYPE: Primary | ICD-10-CM

## 2025-08-14 DIAGNOSIS — R19.8 ALTERNATING CONSTIPATION AND DIARRHEA: ICD-10-CM

## 2025-08-14 DIAGNOSIS — R19.4 CHANGE IN BOWEL HABITS: ICD-10-CM

## 2025-08-14 PROCEDURE — 3008F BODY MASS INDEX DOCD: CPT | Performed by: INTERNAL MEDICINE

## 2025-08-14 PROCEDURE — 99204 OFFICE O/P NEW MOD 45 MIN: CPT | Performed by: INTERNAL MEDICINE

## 2025-08-14 PROCEDURE — 1036F TOBACCO NON-USER: CPT | Performed by: INTERNAL MEDICINE

## 2025-08-14 ASSESSMENT — ENCOUNTER SYMPTOMS: SHORTNESS OF BREATH: 0

## 2025-08-15 PROCEDURE — RXMED WILLOW AMBULATORY MEDICATION CHARGE

## 2025-08-19 ENCOUNTER — PHARMACY VISIT (OUTPATIENT)
Dept: PHARMACY | Facility: CLINIC | Age: 30
End: 2025-08-19
Payer: MEDICARE

## 2025-08-26 PROCEDURE — RXMED WILLOW AMBULATORY MEDICATION CHARGE

## 2025-08-29 ENCOUNTER — PHARMACY VISIT (OUTPATIENT)
Dept: PHARMACY | Facility: CLINIC | Age: 30
End: 2025-08-29
Payer: MEDICARE